# Patient Record
Sex: MALE | Race: BLACK OR AFRICAN AMERICAN | NOT HISPANIC OR LATINO | Employment: OTHER | URBAN - METROPOLITAN AREA
[De-identification: names, ages, dates, MRNs, and addresses within clinical notes are randomized per-mention and may not be internally consistent; named-entity substitution may affect disease eponyms.]

---

## 2023-03-14 PROBLEM — K63.5 COLON POLYP: Status: ACTIVE | Noted: 2023-03-14

## 2023-03-14 PROBLEM — E11.69 TYPE 2 DIABETES MELLITUS WITH HYPERLIPIDEMIA (MULTI): Status: ACTIVE | Noted: 2023-03-14

## 2023-03-14 PROBLEM — H35.3131 EARLY DRY STAGE NONEXUDATIVE AGE-RELATED MACULAR DEGENERATION OF BOTH EYES: Status: ACTIVE | Noted: 2023-03-14

## 2023-03-14 PROBLEM — H52.03 HYPEROPIA OF BOTH EYES WITH REGULAR ASTIGMATISM: Status: ACTIVE | Noted: 2023-03-14

## 2023-03-14 PROBLEM — E78.5 TYPE 2 DIABETES MELLITUS WITH HYPERLIPIDEMIA (MULTI): Status: ACTIVE | Noted: 2023-03-14

## 2023-03-14 PROBLEM — E66.811 OBESITY (BMI 30.0-34.9): Status: ACTIVE | Noted: 2023-03-14

## 2023-03-14 PROBLEM — C18.9 ADENOCARCINOMA OF COLON (MULTI): Status: ACTIVE | Noted: 2023-03-14

## 2023-03-14 PROBLEM — I51.7 LEFT ATRIAL DILATATION: Status: ACTIVE | Noted: 2023-03-14

## 2023-03-14 PROBLEM — N18.9 CHRONIC KIDNEY DISEASE: Status: ACTIVE | Noted: 2023-03-14

## 2023-03-14 PROBLEM — E78.5 HYPERLIPIDEMIA: Status: ACTIVE | Noted: 2023-03-14

## 2023-03-14 PROBLEM — I35.8 AORTIC VALVE SCLEROSIS: Status: ACTIVE | Noted: 2023-03-14

## 2023-03-14 PROBLEM — R01.1 HEART MURMUR: Status: ACTIVE | Noted: 2023-03-14

## 2023-03-14 PROBLEM — H43.399 VITREOUS OPACITY: Status: ACTIVE | Noted: 2023-03-14

## 2023-03-14 PROBLEM — E11.9 DIABETES MELLITUS (MULTI): Status: ACTIVE | Noted: 2023-03-14

## 2023-03-14 PROBLEM — H52.223 REGULAR ASTIGMATISM OF BOTH EYES WITH PRESBYOPIA: Status: ACTIVE | Noted: 2023-03-14

## 2023-03-14 PROBLEM — Q43.8 REDUNDANT COLON: Status: ACTIVE | Noted: 2023-03-14

## 2023-03-14 PROBLEM — H25.043 POSTERIOR SUBCAPSULAR AGE-RELATED CATARACT, BOTH EYES: Status: ACTIVE | Noted: 2023-03-14

## 2023-03-14 PROBLEM — D59.39: Status: ACTIVE | Noted: 2023-03-14

## 2023-03-14 PROBLEM — I51.7 MILD CONCENTRIC LEFT VENTRICULAR HYPERTROPHY (LVH): Status: ACTIVE | Noted: 2023-03-14

## 2023-03-14 PROBLEM — H25.13 CATARACT, NUCLEAR SCLEROTIC, BOTH EYES: Status: ACTIVE | Noted: 2023-03-14

## 2023-03-14 PROBLEM — R93.1 ELEVATED CORONARY ARTERY CALCIUM SCORE: Status: ACTIVE | Noted: 2023-03-14

## 2023-03-14 PROBLEM — I10 HTN (HYPERTENSION): Status: ACTIVE | Noted: 2023-03-14

## 2023-03-14 PROBLEM — M10.9 GOUT: Status: ACTIVE | Noted: 2023-03-14

## 2023-03-14 PROBLEM — H52.223 HYPEROPIA OF BOTH EYES WITH REGULAR ASTIGMATISM: Status: ACTIVE | Noted: 2023-03-14

## 2023-03-14 PROBLEM — C80.1 ADENOCARCINOMA IN ADENOMATOUS POLYP (MULTI): Status: ACTIVE | Noted: 2023-03-14

## 2023-03-14 PROBLEM — H52.4 REGULAR ASTIGMATISM OF BOTH EYES WITH PRESBYOPIA: Status: ACTIVE | Noted: 2023-03-14

## 2023-03-14 PROBLEM — I25.10 CAD (CORONARY ARTERY DISEASE): Status: ACTIVE | Noted: 2023-03-14

## 2023-03-14 PROBLEM — D64.9 ANEMIA: Status: ACTIVE | Noted: 2023-03-14

## 2023-03-14 PROBLEM — K57.90 DIVERTICULOSIS: Status: ACTIVE | Noted: 2023-03-14

## 2023-03-14 PROBLEM — E66.9 OBESITY (BMI 30.0-34.9): Status: ACTIVE | Noted: 2023-03-14

## 2023-03-14 PROBLEM — I73.9 PERIPHERAL ARTERY DISEASE (CMS-HCC): Status: ACTIVE | Noted: 2023-03-14

## 2023-03-14 PROBLEM — K80.20 CHOLELITHIASES: Status: ACTIVE | Noted: 2023-03-14

## 2023-03-14 PROBLEM — H52.13 BILATERAL MYOPIA: Status: ACTIVE | Noted: 2023-03-14

## 2023-03-14 RX ORDER — CLONIDINE HYDROCHLORIDE 0.1 MG/1
1 TABLET ORAL 2 TIMES DAILY
COMMUNITY
Start: 2021-07-23 | End: 2023-11-06

## 2023-03-14 RX ORDER — AMLODIPINE BESYLATE 10 MG/1
1 TABLET ORAL DAILY
COMMUNITY
Start: 2020-02-01 | End: 2023-11-06

## 2023-03-14 RX ORDER — EZETIMIBE 10 MG/1
1 TABLET ORAL DAILY
COMMUNITY
Start: 2022-11-29 | End: 2023-11-10

## 2023-03-14 RX ORDER — LOSARTAN POTASSIUM 100 MG/1
1 TABLET ORAL DAILY
COMMUNITY
Start: 2021-08-30 | End: 2023-11-06

## 2023-03-14 RX ORDER — METOPROLOL SUCCINATE 100 MG/1
1 TABLET, EXTENDED RELEASE ORAL DAILY
COMMUNITY
Start: 2021-08-30 | End: 2024-05-24 | Stop reason: WASHOUT

## 2023-03-14 RX ORDER — ATORVASTATIN CALCIUM 40 MG/1
1 TABLET, FILM COATED ORAL DAILY
COMMUNITY
Start: 2021-08-30 | End: 2024-01-29

## 2023-03-21 ENCOUNTER — OFFICE VISIT (OUTPATIENT)
Dept: PRIMARY CARE | Facility: CLINIC | Age: 73
End: 2023-03-21
Payer: MEDICARE

## 2023-03-21 ENCOUNTER — LAB (OUTPATIENT)
Dept: LAB | Facility: LAB | Age: 73
End: 2023-03-21
Payer: MEDICARE

## 2023-03-21 VITALS
TEMPERATURE: 96.4 F | RESPIRATION RATE: 14 BRPM | HEART RATE: 54 BPM | HEIGHT: 68 IN | SYSTOLIC BLOOD PRESSURE: 162 MMHG | BODY MASS INDEX: 29.25 KG/M2 | DIASTOLIC BLOOD PRESSURE: 84 MMHG | WEIGHT: 193 LBS | OXYGEN SATURATION: 99 %

## 2023-03-21 DIAGNOSIS — Z00.00 MEDICARE ANNUAL WELLNESS VISIT, SUBSEQUENT: Primary | ICD-10-CM

## 2023-03-21 DIAGNOSIS — E78.5 HYPERLIPIDEMIA, UNSPECIFIED HYPERLIPIDEMIA TYPE: ICD-10-CM

## 2023-03-21 DIAGNOSIS — I10 PRIMARY HYPERTENSION: ICD-10-CM

## 2023-03-21 DIAGNOSIS — M1A.9XX0 CHRONIC GOUT WITHOUT TOPHUS, UNSPECIFIED CAUSE, UNSPECIFIED SITE: ICD-10-CM

## 2023-03-21 DIAGNOSIS — E11.9 TYPE 2 DIABETES MELLITUS WITHOUT COMPLICATION, WITHOUT LONG-TERM CURRENT USE OF INSULIN (MULTI): ICD-10-CM

## 2023-03-21 DIAGNOSIS — D64.9 ANEMIA, UNSPECIFIED TYPE: ICD-10-CM

## 2023-03-21 DIAGNOSIS — Z12.5 PROSTATE CANCER SCREENING: ICD-10-CM

## 2023-03-21 DIAGNOSIS — Z13.6 SCREENING FOR AAA (ABDOMINAL AORTIC ANEURYSM): ICD-10-CM

## 2023-03-21 PROBLEM — K63.5 COLON POLYP: Status: RESOLVED | Noted: 2023-03-14 | Resolved: 2023-03-21

## 2023-03-21 PROBLEM — C80.1 ADENOCARCINOMA IN ADENOMATOUS POLYP (MULTI): Status: RESOLVED | Noted: 2023-03-14 | Resolved: 2023-03-21

## 2023-03-21 LAB
ALANINE AMINOTRANSFERASE (SGPT) (U/L) IN SER/PLAS: 13 U/L (ref 10–52)
ALBUMIN (MG/L) IN URINE: 54.4 MG/L
ALBUMIN/CREATININE (UG/MG) IN URINE: 51.3 UG/MG CRT (ref 0–30)
ANION GAP IN SER/PLAS: 13 MMOL/L (ref 10–20)
ASPARTATE AMINOTRANSFERASE (SGOT) (U/L) IN SER/PLAS: 13 U/L (ref 9–39)
CALCIUM (MG/DL) IN SER/PLAS: 9.3 MG/DL (ref 8.6–10.6)
CARBON DIOXIDE, TOTAL (MMOL/L) IN SER/PLAS: 24 MMOL/L (ref 21–32)
CHLORIDE (MMOL/L) IN SER/PLAS: 109 MMOL/L (ref 98–107)
CHOLESTEROL (MG/DL) IN SER/PLAS: 190 MG/DL (ref 0–199)
CHOLESTEROL IN HDL (MG/DL) IN SER/PLAS: 37.4 MG/DL
CHOLESTEROL/HDL RATIO: 5.1
COBALAMIN (VITAMIN B12) (PG/ML) IN SER/PLAS: 283 PG/ML (ref 211–911)
CREATININE (MG/DL) IN SER/PLAS: 1.51 MG/DL (ref 0.5–1.3)
CREATININE (MG/DL) IN URINE: 106 MG/DL (ref 20–370)
ERYTHROCYTE DISTRIBUTION WIDTH (RATIO) BY AUTOMATED COUNT: 12.7 % (ref 11.5–14.5)
ERYTHROCYTE MEAN CORPUSCULAR HEMOGLOBIN CONCENTRATION (G/DL) BY AUTOMATED: 31.6 G/DL (ref 32–36)
ERYTHROCYTE MEAN CORPUSCULAR VOLUME (FL) BY AUTOMATED COUNT: 98 FL (ref 80–100)
ERYTHROCYTES (10*6/UL) IN BLOOD BY AUTOMATED COUNT: 3.82 X10E12/L (ref 4.5–5.9)
ESTIMATED AVERAGE GLUCOSE FOR HBA1C: 174 MG/DL
FERRITIN (UG/LL) IN SER/PLAS: 127 UG/L (ref 20–300)
FOLATE (NG/ML) IN SER/PLAS: 9.1 NG/ML
GFR MALE: 48 ML/MIN/1.73M2
GLUCOSE (MG/DL) IN SER/PLAS: 165 MG/DL (ref 74–99)
HEMATOCRIT (%) IN BLOOD BY AUTOMATED COUNT: 37.3 % (ref 41–52)
HEMOGLOBIN (G/DL) IN BLOOD: 11.8 G/DL (ref 13.5–17.5)
HEMOGLOBIN A1C/HEMOGLOBIN TOTAL IN BLOOD: 7.7 %
IRON (UG/DL) IN SER/PLAS: 69 UG/DL (ref 35–150)
IRON BINDING CAPACITY (UG/DL) IN SER/PLAS: 324 UG/DL (ref 240–445)
IRON SATURATION (%) IN SER/PLAS: 21 % (ref 25–45)
LDL: 131 MG/DL (ref 0–99)
LEUKOCYTES (10*3/UL) IN BLOOD BY AUTOMATED COUNT: 3.8 X10E9/L (ref 4.4–11.3)
NRBC (PER 100 WBCS) BY AUTOMATED COUNT: 0 /100 WBC (ref 0–0)
PLATELETS (10*3/UL) IN BLOOD AUTOMATED COUNT: 227 X10E9/L (ref 150–450)
POTASSIUM (MMOL/L) IN SER/PLAS: 4.5 MMOL/L (ref 3.5–5.3)
PROSTATE SPECIFIC ANTIGEN,SCREEN: 1.23 NG/ML (ref 0–4)
SODIUM (MMOL/L) IN SER/PLAS: 141 MMOL/L (ref 136–145)
THYROTROPIN (MIU/L) IN SER/PLAS BY DETECTION LIMIT <= 0.05 MIU/L: 1.92 MIU/L (ref 0.44–3.98)
TRIGLYCERIDE (MG/DL) IN SER/PLAS: 108 MG/DL (ref 0–149)
URATE (MG/DL) IN SER/PLAS: 6.3 MG/DL (ref 4–7.5)
UREA NITROGEN (MG/DL) IN SER/PLAS: 24 MG/DL (ref 6–23)
VLDL: 22 MG/DL (ref 0–40)

## 2023-03-21 PROCEDURE — 1036F TOBACCO NON-USER: CPT | Performed by: FAMILY MEDICINE

## 2023-03-21 PROCEDURE — 82570 ASSAY OF URINE CREATININE: CPT

## 2023-03-21 PROCEDURE — 80061 LIPID PANEL: CPT

## 2023-03-21 PROCEDURE — 4010F ACE/ARB THERAPY RXD/TAKEN: CPT | Performed by: FAMILY MEDICINE

## 2023-03-21 PROCEDURE — 83036 HEMOGLOBIN GLYCOSYLATED A1C: CPT

## 2023-03-21 PROCEDURE — 84550 ASSAY OF BLOOD/URIC ACID: CPT

## 2023-03-21 PROCEDURE — G0439 PPPS, SUBSEQ VISIT: HCPCS | Performed by: FAMILY MEDICINE

## 2023-03-21 PROCEDURE — 84153 ASSAY OF PSA TOTAL: CPT

## 2023-03-21 PROCEDURE — 83540 ASSAY OF IRON: CPT

## 2023-03-21 PROCEDURE — 83550 IRON BINDING TEST: CPT

## 2023-03-21 PROCEDURE — 36415 COLL VENOUS BLD VENIPUNCTURE: CPT

## 2023-03-21 PROCEDURE — 80048 BASIC METABOLIC PNL TOTAL CA: CPT

## 2023-03-21 PROCEDURE — 85027 COMPLETE CBC AUTOMATED: CPT

## 2023-03-21 PROCEDURE — 84450 TRANSFERASE (AST) (SGOT): CPT

## 2023-03-21 PROCEDURE — 3077F SYST BP >= 140 MM HG: CPT | Performed by: FAMILY MEDICINE

## 2023-03-21 PROCEDURE — 84460 ALANINE AMINO (ALT) (SGPT): CPT

## 2023-03-21 PROCEDURE — 82043 UR ALBUMIN QUANTITATIVE: CPT

## 2023-03-21 PROCEDURE — 84443 ASSAY THYROID STIM HORMONE: CPT

## 2023-03-21 PROCEDURE — 1160F RVW MEDS BY RX/DR IN RCRD: CPT | Performed by: FAMILY MEDICINE

## 2023-03-21 PROCEDURE — 82607 VITAMIN B-12: CPT

## 2023-03-21 PROCEDURE — 82728 ASSAY OF FERRITIN: CPT

## 2023-03-21 PROCEDURE — 3051F HG A1C>EQUAL 7.0%<8.0%: CPT | Performed by: FAMILY MEDICINE

## 2023-03-21 PROCEDURE — 3079F DIAST BP 80-89 MM HG: CPT | Performed by: FAMILY MEDICINE

## 2023-03-21 PROCEDURE — 1159F MED LIST DOCD IN RCRD: CPT | Performed by: FAMILY MEDICINE

## 2023-03-21 PROCEDURE — 82746 ASSAY OF FOLIC ACID SERUM: CPT

## 2023-03-21 ASSESSMENT — PATIENT HEALTH QUESTIONNAIRE - PHQ9
2. FEELING DOWN, DEPRESSED OR HOPELESS: NOT AT ALL
1. LITTLE INTEREST OR PLEASURE IN DOING THINGS: NOT AT ALL
SUM OF ALL RESPONSES TO PHQ9 QUESTIONS 1 AND 2: 0

## 2023-03-21 NOTE — PATIENT INSTRUCTIONS
Flu shot Up to date  PPSV23 Up to date  PCV13 N/A  PCV20 N/A  Shingrix Recommended  Colon cancer screening 8/2022.  Lung cancer screening N/A  AAA screening Ordered  HIV screening N/A  Prostate cancer screening  Ordered    Fasting labs.  Recommend living will, health care power of .     F/U for diabetes visit if HbA1c >6.4.

## 2023-03-21 NOTE — PROGRESS NOTES
"Subjective   Reason for Visit: Carlos Vallejo is an 72 y.o. male here for a Medicare Wellness visit.     Medicare Wellness Exam    The patent is being seen for a follow up annual wellness visit  Past Medical, Surgical and family History: Reviewed and updated in chart  Interval History: Patient has not been hospitalized previously  Medications and Supplements: Review of all medications by a prescribing practitioner or clinical pharmacist (such as prescriptions, OTC, Herbal therapies and supplements) documented in the medical record.    Patient Self-Assessment of health: Good  Tobacco Use: No  Alcohol Use: Yes  Illicit drug use: No  Patient using opioids: No    Current Diet: low salt  Adequate fluid intake: Yes  Caffeine intake: Yes  Exercise frequency: Infrequently    Depression/Suicide screening: PHQ2/ PHQ9 (see screenings tab)    Hearing impairment: No  Uses hearing aids No  Cognitive impairment Observation: No   Patient or family reported cognitive impairment: No    Bathing: independent  Dressing: independent  Walking: independent  Taking Medications: independent  Feeding: independent  Personal Hygiene: independent  Managing Finances: independent  Shopping: independent  Housework/Basic Home Maintenance: independent  Handling transportation: independent  Preparing meals: independent    Bowels: continent  Bladder: continent    Falls Risk: has notfallen in last 6 months.    Home Safety Risk Factors: Home Safety Risk Factors: None  Advanced Directives:  Living will: No POA: No    Specialists:  Cardiology, nephrology.    Of note:   Never returned for diabetic visit as advised.  Stopped Clonidine on his own (Rxd by cardiology).    HPI    Patient Care Team:  Sergei Dowell MD as PCP - General     Objective   Vitals:  /84   Pulse 54   Temp 35.8 °C (96.4 °F)   Resp 14   Ht 1.727 m (5' 8\")   Wt 87.5 kg (193 lb)   SpO2 99%   BMI 29.35 kg/m²       Physical Exam  Constitutional:       General: He is not in acute " distress.     Appearance: Normal appearance.   Musculoskeletal:      Comments: Ambulating w/out assistance   Neurological:      Mental Status: He is oriented to person, place, and time.   Psychiatric:         Mood and Affect: Mood normal.         Behavior: Behavior normal.     Assessment/Plan   Diagnoses and all orders for this visit:  Medicare annual wellness visit, subsequent  Anemia, unspecified type  -     Ferritin; Future  -     Folate; Future  -     Iron and TIBC; Future  -     Vitamin B12; Future  Chronic gout without tophus, unspecified cause, unspecified site  -     Uric Acid; Future  Primary hypertension  -     CBC; Future  -     Basic Metabolic Panel; Future  Type 2 diabetes mellitus without complication, without long-term current use of insulin (CMS/[facility])  -     TSH with reflex to Free T4 if abnormal; Future  -     Albumin , Urine Random; Future  -     Hemoglobin A1C; Future  Screening for AAA (abdominal aortic aneurysm)  -     Vascular US abdominal aorta aneurysm AAA screening; Future  Prostate cancer screening  -     Prostate Specific Antigen, Screen; Future  Hyperlipidemia, unspecified hyperlipidemia type  -     Lipid Panel; Future  -     Alanine Aminotransferase; Future  -     Aspartate Aminotransferase; Future    Flu shot Up to date  PPSV23 Up to date  PCV13 N/A  PCV20 N/A  Shingrix Recommended  Colon cancer screening 8/2022.  Lung cancer screening N/A  AAA screening Ordered  HIV screening N/A  Prostate cancer screening  Ordered    Fasting labs.  Recommend living will, health care power of .     F/U for diabetes visit if HbA1c >6.4.

## 2023-04-04 ENCOUNTER — APPOINTMENT (OUTPATIENT)
Dept: PRIMARY CARE | Facility: CLINIC | Age: 73
End: 2023-04-04
Payer: MEDICARE

## 2023-04-04 ENCOUNTER — OFFICE VISIT (OUTPATIENT)
Dept: PRIMARY CARE | Facility: CLINIC | Age: 73
End: 2023-04-04
Payer: MEDICARE

## 2023-04-04 VITALS
HEART RATE: 73 BPM | RESPIRATION RATE: 14 BRPM | TEMPERATURE: 97 F | HEIGHT: 68 IN | WEIGHT: 193 LBS | SYSTOLIC BLOOD PRESSURE: 147 MMHG | BODY MASS INDEX: 29.25 KG/M2 | DIASTOLIC BLOOD PRESSURE: 87 MMHG

## 2023-04-04 DIAGNOSIS — D72.819 LEUKOPENIA, UNSPECIFIED TYPE: ICD-10-CM

## 2023-04-04 DIAGNOSIS — E11.22 TYPE 2 DIABETES MELLITUS WITH STAGE 3A CHRONIC KIDNEY DISEASE, WITHOUT LONG-TERM CURRENT USE OF INSULIN (MULTI): Primary | ICD-10-CM

## 2023-04-04 DIAGNOSIS — D64.9 ANEMIA, UNSPECIFIED TYPE: ICD-10-CM

## 2023-04-04 DIAGNOSIS — N18.31 TYPE 2 DIABETES MELLITUS WITH STAGE 3A CHRONIC KIDNEY DISEASE, WITHOUT LONG-TERM CURRENT USE OF INSULIN (MULTI): Primary | ICD-10-CM

## 2023-04-04 PROBLEM — E78.5 TYPE 2 DIABETES MELLITUS WITH HYPERLIPIDEMIA (MULTI): Status: RESOLVED | Noted: 2023-03-14 | Resolved: 2023-04-04

## 2023-04-04 PROBLEM — E11.69 TYPE 2 DIABETES MELLITUS WITH HYPERLIPIDEMIA (MULTI): Status: RESOLVED | Noted: 2023-03-14 | Resolved: 2023-04-04

## 2023-04-04 PROCEDURE — 3051F HG A1C>EQUAL 7.0%<8.0%: CPT | Performed by: FAMILY MEDICINE

## 2023-04-04 PROCEDURE — 3077F SYST BP >= 140 MM HG: CPT | Performed by: FAMILY MEDICINE

## 2023-04-04 PROCEDURE — 1160F RVW MEDS BY RX/DR IN RCRD: CPT | Performed by: FAMILY MEDICINE

## 2023-04-04 PROCEDURE — 3079F DIAST BP 80-89 MM HG: CPT | Performed by: FAMILY MEDICINE

## 2023-04-04 PROCEDURE — 1159F MED LIST DOCD IN RCRD: CPT | Performed by: FAMILY MEDICINE

## 2023-04-04 PROCEDURE — 99214 OFFICE O/P EST MOD 30 MIN: CPT | Performed by: FAMILY MEDICINE

## 2023-04-04 PROCEDURE — 1036F TOBACCO NON-USER: CPT | Performed by: FAMILY MEDICINE

## 2023-04-04 PROCEDURE — 4010F ACE/ARB THERAPY RXD/TAKEN: CPT | Performed by: FAMILY MEDICINE

## 2023-04-04 NOTE — PATIENT INSTRUCTIONS
Reviewed lab results.  Referred to nutritionist.  Patient declined Metformin or any other medication for diabetes (wants to do 3 month trial of diet/exercise).  Continue Losartan blood pressure (managed by cardiology) and renal protection.  Continue Atorvastatin for cholesterol (managed by cardiology).  Unable to take OTC Aspirin 81 mg daily for cardiac protection (patient is allergic)  Referred to ophthalmology and podiatry for annual eye and foot exams.  Recommend exercising (brisk walking, jogging, swimming, bicycling) 30 minutes/day, 5 days/week.  Recommend weight loss efforts (see www.yourweightmatters.org/category/nutrition for ideas).  Join the Living with Type 2 Diabetes Program to receive healthy recipes, tools to help care for your diabetes, opportunities for online and community support.    Call 1-825.527.5188 or visit www.diabetes.org/living    Follow up w/cardiology for blood pressure and cholesterol management.    F/U 3 months (labs prior to visit): Diabetes follow up, Consider medication if HbA1c not at goal, Consider hematology referral if WBC still decreased.

## 2023-04-04 NOTE — PROGRESS NOTES
"Subjective   Patient ID: Carlos Vallejo is a 72 y.o. male who presents for discuss results.    HPI   Labs ordered at lats visit.    Here for new onset diabetes visit.    Review of Systems   All other systems reviewed and are negative.  Objective   /87   Pulse 73   Temp 36.1 °C (97 °F)   Resp 14   Ht 1.727 m (5' 8\")   Wt 87.5 kg (193 lb)   BMI 29.35 kg/m²   Physical Exam  Constitutional:       General: He is not in acute distress.     Appearance: Normal appearance.   Musculoskeletal:      Comments: Ambulating w/out assistance   Neurological:      Mental Status: He is oriented to person, place, and time.   Psychiatric:         Mood and Affect: Mood normal.         Behavior: Behavior normal.     Assessment/Plan   Diagnoses and all orders for this visit:  Type 2 diabetes mellitus with stage 3a chronic kidney disease, without long-term current use of insulin (CMS/[facility])  -     Referral to Nutrition Services; Future  -     Referral to Ophthalmology; Future  -     Referral to Podiatry; Future  -     Hemoglobin A1C; Future  -     Basic Metabolic Panel; Future  Anemia, unspecified type  -     CBC and Auto Differential; Future  Leukopenia, unspecified type  -     CBC and Auto Differential; Future    Reviewed lab results.  Referred to nutritionist.  Patient declined Metformin or any other medication for diabetes (wants to do 3 month trial of diet/exercise).  Continue Losartan blood pressure (managed by cardiology) and renal protection.  Continue Atorvastatin for cholesterol (managed by cardiology).  Unable to take OTC Aspirin 81 mg daily for cardiac protection (patient is allergic).  Referred to ophthalmology and podiatry for annual eye and foot exams.  Recommend exercising (brisk walking, jogging, swimming, bicycling) 30 minutes/day, 5 days/week.  Recommend weight loss efforts (see www.yourweightmatters.org/category/nutrition for ideas).  Join the Living with Type 2 Diabetes Program to receive healthy recipes, tools " to help care for your diabetes, opportunities for online and community support.    Call 1-288.902.9746 or visit www.diabetes.org/living    Follow up w/cardiology for blood pressure and cholesterol management.    F/U 3 months (labs prior to visit): Diabetes follow up, Consider medication if HbA1c not at goal, Consider hematology referral if WBC still decreased.

## 2023-08-24 ENCOUNTER — HOSPITAL ENCOUNTER (OUTPATIENT)
Dept: DATA CONVERSION | Facility: HOSPITAL | Age: 73
End: 2023-08-24
Attending: INTERNAL MEDICINE | Admitting: INTERNAL MEDICINE
Payer: MEDICARE

## 2023-08-24 DIAGNOSIS — K52.9 NONINFECTIVE GASTROENTERITIS AND COLITIS, UNSPECIFIED: ICD-10-CM

## 2023-08-24 DIAGNOSIS — Z12.11 ENCOUNTER FOR SCREENING FOR MALIGNANT NEOPLASM OF COLON: ICD-10-CM

## 2023-08-24 DIAGNOSIS — Z86.010 PERSONAL HISTORY OF COLONIC POLYPS: ICD-10-CM

## 2023-08-24 DIAGNOSIS — Z85.038 PERSONAL HISTORY OF OTHER MALIGNANT NEOPLASM OF LARGE INTESTINE: ICD-10-CM

## 2023-08-24 DIAGNOSIS — K64.8 OTHER HEMORRHOIDS: ICD-10-CM

## 2023-08-24 DIAGNOSIS — K57.30 DIVERTICULOSIS OF LARGE INTESTINE WITHOUT PERFORATION OR ABSCESS WITHOUT BLEEDING: ICD-10-CM

## 2023-08-24 DIAGNOSIS — K64.4 RESIDUAL HEMORRHOIDAL SKIN TAGS: ICD-10-CM

## 2023-08-30 LAB
COMPLETE PATHOLOGY REPORT: NORMAL
CONVERTED CLINICAL DIAGNOSIS-HISTORY: NORMAL
CONVERTED FINAL DIAGNOSIS: NORMAL
CONVERTED FINAL REPORT PDF LINK TO COPY AND PASTE: NORMAL
CONVERTED GROSS DESCRIPTION: NORMAL

## 2023-10-25 DIAGNOSIS — I10 ESSENTIAL (PRIMARY) HYPERTENSION: ICD-10-CM

## 2023-11-06 RX ORDER — LOSARTAN POTASSIUM 100 MG/1
100 TABLET ORAL DAILY
Qty: 90 TABLET | Refills: 0 | Status: SHIPPED | OUTPATIENT
Start: 2023-11-06 | End: 2024-05-20

## 2023-11-06 RX ORDER — CLONIDINE HYDROCHLORIDE 0.1 MG/1
0.1 TABLET ORAL 2 TIMES DAILY
Qty: 180 TABLET | Refills: 0 | Status: SHIPPED | OUTPATIENT
Start: 2023-11-06 | End: 2024-05-24 | Stop reason: SDUPTHER

## 2023-11-06 RX ORDER — AMLODIPINE BESYLATE 10 MG/1
10 TABLET ORAL DAILY
Qty: 90 TABLET | Refills: 0 | Status: SHIPPED | OUTPATIENT
Start: 2023-11-06 | End: 2024-05-24 | Stop reason: SDUPTHER

## 2024-01-26 DIAGNOSIS — E78.5 HYPERLIPIDEMIA, UNSPECIFIED: Primary | ICD-10-CM

## 2024-01-29 RX ORDER — ATORVASTATIN CALCIUM 40 MG/1
40 TABLET, FILM COATED ORAL DAILY
Qty: 90 TABLET | Refills: 3 | Status: SHIPPED | OUTPATIENT
Start: 2024-01-29 | End: 2024-05-24 | Stop reason: SDUPTHER

## 2024-02-06 ENCOUNTER — OFFICE VISIT (OUTPATIENT)
Dept: PRIMARY CARE | Facility: CLINIC | Age: 74
End: 2024-02-06
Payer: COMMERCIAL

## 2024-02-06 ENCOUNTER — LAB (OUTPATIENT)
Dept: LAB | Facility: LAB | Age: 74
End: 2024-02-06
Payer: COMMERCIAL

## 2024-02-06 VITALS
WEIGHT: 194 LBS | OXYGEN SATURATION: 96 % | RESPIRATION RATE: 16 BRPM | BODY MASS INDEX: 29.4 KG/M2 | DIASTOLIC BLOOD PRESSURE: 74 MMHG | HEART RATE: 69 BPM | TEMPERATURE: 97.7 F | HEIGHT: 68 IN | SYSTOLIC BLOOD PRESSURE: 178 MMHG

## 2024-02-06 DIAGNOSIS — D64.9 ANEMIA, UNSPECIFIED TYPE: ICD-10-CM

## 2024-02-06 DIAGNOSIS — N18.31 TYPE 2 DIABETES MELLITUS WITH STAGE 3A CHRONIC KIDNEY DISEASE, WITHOUT LONG-TERM CURRENT USE OF INSULIN (MULTI): ICD-10-CM

## 2024-02-06 DIAGNOSIS — E78.5 HYPERLIPIDEMIA, UNSPECIFIED HYPERLIPIDEMIA TYPE: ICD-10-CM

## 2024-02-06 DIAGNOSIS — D72.819 LEUKOPENIA, UNSPECIFIED TYPE: ICD-10-CM

## 2024-02-06 DIAGNOSIS — E11.22 TYPE 2 DIABETES MELLITUS WITH STAGE 3A CHRONIC KIDNEY DISEASE, WITHOUT LONG-TERM CURRENT USE OF INSULIN (MULTI): ICD-10-CM

## 2024-02-06 DIAGNOSIS — E11.22 TYPE 2 DIABETES MELLITUS WITH STAGE 3A CHRONIC KIDNEY DISEASE, WITHOUT LONG-TERM CURRENT USE OF INSULIN (MULTI): Primary | ICD-10-CM

## 2024-02-06 DIAGNOSIS — I10 PRIMARY HYPERTENSION: ICD-10-CM

## 2024-02-06 DIAGNOSIS — N18.31 TYPE 2 DIABETES MELLITUS WITH STAGE 3A CHRONIC KIDNEY DISEASE, WITHOUT LONG-TERM CURRENT USE OF INSULIN (MULTI): Primary | ICD-10-CM

## 2024-02-06 LAB
ANION GAP SERPL CALC-SCNC: 12 MMOL/L (ref 10–20)
BASOPHILS # BLD AUTO: 0.02 X10*3/UL (ref 0–0.1)
BASOPHILS NFR BLD AUTO: 0.4 %
BUN SERPL-MCNC: 22 MG/DL (ref 6–23)
CALCIUM SERPL-MCNC: 9.4 MG/DL (ref 8.6–10.6)
CHLORIDE SERPL-SCNC: 109 MMOL/L (ref 98–107)
CO2 SERPL-SCNC: 27 MMOL/L (ref 21–32)
CREAT SERPL-MCNC: 1.77 MG/DL (ref 0.5–1.3)
EGFRCR SERPLBLD CKD-EPI 2021: 40 ML/MIN/1.73M*2
EOSINOPHIL # BLD AUTO: 0.19 X10*3/UL (ref 0–0.4)
EOSINOPHIL NFR BLD AUTO: 4.3 %
ERYTHROCYTE [DISTWIDTH] IN BLOOD BY AUTOMATED COUNT: 12.9 % (ref 11.5–14.5)
EST. AVERAGE GLUCOSE BLD GHB EST-MCNC: 151 MG/DL
GLUCOSE SERPL-MCNC: 129 MG/DL (ref 74–99)
HBA1C MFR BLD: 6.9 %
HCT VFR BLD AUTO: 41 % (ref 41–52)
HGB BLD-MCNC: 13.1 G/DL (ref 13.5–17.5)
IMM GRANULOCYTES # BLD AUTO: 0.01 X10*3/UL (ref 0–0.5)
IMM GRANULOCYTES NFR BLD AUTO: 0.2 % (ref 0–0.9)
LYMPHOCYTES # BLD AUTO: 1.82 X10*3/UL (ref 0.8–3)
LYMPHOCYTES NFR BLD AUTO: 40.9 %
MCH RBC QN AUTO: 31.1 PG (ref 26–34)
MCHC RBC AUTO-ENTMCNC: 32 G/DL (ref 32–36)
MCV RBC AUTO: 97 FL (ref 80–100)
MONOCYTES # BLD AUTO: 0.48 X10*3/UL (ref 0.05–0.8)
MONOCYTES NFR BLD AUTO: 10.8 %
NEUTROPHILS # BLD AUTO: 1.93 X10*3/UL (ref 1.6–5.5)
NEUTROPHILS NFR BLD AUTO: 43.4 %
NRBC BLD-RTO: 0 /100 WBCS (ref 0–0)
PLATELET # BLD AUTO: 279 X10*3/UL (ref 150–450)
POTASSIUM SERPL-SCNC: 4.7 MMOL/L (ref 3.5–5.3)
RBC # BLD AUTO: 4.21 X10*6/UL (ref 4.5–5.9)
SODIUM SERPL-SCNC: 143 MMOL/L (ref 136–145)
WBC # BLD AUTO: 4.5 X10*3/UL (ref 4.4–11.3)

## 2024-02-06 PROCEDURE — 85025 COMPLETE CBC W/AUTO DIFF WBC: CPT

## 2024-02-06 PROCEDURE — 3077F SYST BP >= 140 MM HG: CPT | Performed by: FAMILY MEDICINE

## 2024-02-06 PROCEDURE — 83036 HEMOGLOBIN GLYCOSYLATED A1C: CPT

## 2024-02-06 PROCEDURE — 3078F DIAST BP <80 MM HG: CPT | Performed by: FAMILY MEDICINE

## 2024-02-06 PROCEDURE — 4010F ACE/ARB THERAPY RXD/TAKEN: CPT | Performed by: FAMILY MEDICINE

## 2024-02-06 PROCEDURE — 80048 BASIC METABOLIC PNL TOTAL CA: CPT

## 2024-02-06 PROCEDURE — 36415 COLL VENOUS BLD VENIPUNCTURE: CPT

## 2024-02-06 PROCEDURE — 1036F TOBACCO NON-USER: CPT | Performed by: FAMILY MEDICINE

## 2024-02-06 PROCEDURE — 3044F HG A1C LEVEL LT 7.0%: CPT | Performed by: FAMILY MEDICINE

## 2024-02-06 PROCEDURE — 1159F MED LIST DOCD IN RCRD: CPT | Performed by: FAMILY MEDICINE

## 2024-02-06 PROCEDURE — 1160F RVW MEDS BY RX/DR IN RCRD: CPT | Performed by: FAMILY MEDICINE

## 2024-02-06 PROCEDURE — 99214 OFFICE O/P EST MOD 30 MIN: CPT | Performed by: FAMILY MEDICINE

## 2024-02-06 NOTE — PROGRESS NOTES
"Subjective   Patient ID: Carlos Vallejo is a 73 y.o. male who presents for Follow-up (DM visit).    HPI   Had new onset DM visit 10 months ago.  Did not follow up 7 months ago as advised.  Did not return for follow up labs 7 months ago as advised.  Previously declined Tx for DM.  Now states he might be willing to start DM med.    H/O HLD, HTN.  Tx by cardiology.  Has not had meds > 1 month.    Review of Systems  No other complaints.     Objective   /74   Pulse 69   Temp 36.5 °C (97.7 °F)   Resp 16   Ht 1.727 m (5' 8\")   Wt 88 kg (194 lb)   SpO2 96%   BMI 29.50 kg/m²     Physical Exam  Constitutional:       General: He is not in acute distress.     Appearance: Normal appearance.   Cardiovascular:      Rate and Rhythm: Normal rate and regular rhythm.      Heart sounds: Murmur (2/6, systolic) heard.   Pulmonary:      Effort: Pulmonary effort is normal.      Breath sounds: Normal breath sounds. No wheezing, rhonchi or rales.   Neurological:      Mental Status: He is oriented to person, place, and time.   Psychiatric:         Mood and Affect: Mood normal.         Behavior: Behavior normal.     Assessment/Plan   Diagnoses and all orders for this visit:  Type 2 diabetes mellitus with stage 3a chronic kidney disease, without long-term current use of insulin (CMS/[facility])  Primary hypertension        -     Tx by cardiology (not compliant w/meds)  Hyperlipidemia, unspecified hyperlipidemia type        -     Tx by cardiology (not compliant w/meds)  Leukopenia, unspecified type    Nonfasting labs previously ordered.  Will consider Farxiga or Jardiance for diabetes and kidney disease progression/cardiovascular risk reduction if HbA1c not at goal.  Will consider hematology referral if WBC still decreased.   Take blood pressure and cholesterol medications as directed.  Follow up with cardiology ASAP.    F/U 3 months: Annual wellness visit.  "

## 2024-02-06 NOTE — PATIENT INSTRUCTIONS
Nonfasting labs previously ordered.  Will consider Farxiga or Jardiance for diabetes and kidney disease progression/cardiovascular risk reduction if HbA1c not at goal.  Will consider hematology referral if WBC still decreased.   Take blood pressure and cholesterol medications as directed.  Follow up with cardiology ASAP.    F/U 3 months: Annual wellness visit.

## 2024-02-18 DIAGNOSIS — N18.31 TYPE 2 DIABETES MELLITUS WITH STAGE 3A CHRONIC KIDNEY DISEASE, WITHOUT LONG-TERM CURRENT USE OF INSULIN (MULTI): Primary | ICD-10-CM

## 2024-02-18 DIAGNOSIS — E11.22 TYPE 2 DIABETES MELLITUS WITH STAGE 3A CHRONIC KIDNEY DISEASE, WITHOUT LONG-TERM CURRENT USE OF INSULIN (MULTI): Primary | ICD-10-CM

## 2024-03-02 DIAGNOSIS — E78.5 HYPERLIPIDEMIA, UNSPECIFIED HYPERLIPIDEMIA TYPE: ICD-10-CM

## 2024-03-02 DIAGNOSIS — E11.9 TYPE 2 DIABETES MELLITUS WITHOUT COMPLICATION, WITHOUT LONG-TERM CURRENT USE OF INSULIN (MULTI): ICD-10-CM

## 2024-03-02 DIAGNOSIS — Z11.59 NEED FOR HEPATITIS C SCREENING TEST: ICD-10-CM

## 2024-03-02 DIAGNOSIS — D64.9 ANEMIA, UNSPECIFIED TYPE: Primary | ICD-10-CM

## 2024-03-02 DIAGNOSIS — I10 PRIMARY HYPERTENSION: ICD-10-CM

## 2024-05-07 ENCOUNTER — OFFICE VISIT (OUTPATIENT)
Dept: PRIMARY CARE | Facility: CLINIC | Age: 74
End: 2024-05-07
Payer: MEDICARE

## 2024-05-07 ENCOUNTER — LAB (OUTPATIENT)
Dept: LAB | Facility: LAB | Age: 74
End: 2024-05-07
Payer: MEDICARE

## 2024-05-07 VITALS
SYSTOLIC BLOOD PRESSURE: 134 MMHG | DIASTOLIC BLOOD PRESSURE: 82 MMHG | OXYGEN SATURATION: 99 % | HEART RATE: 74 BPM | WEIGHT: 190 LBS | BODY MASS INDEX: 28.79 KG/M2 | HEIGHT: 68 IN | RESPIRATION RATE: 12 BRPM

## 2024-05-07 DIAGNOSIS — F17.219 CIGARETTE NICOTINE DEPENDENCE WITH NICOTINE-INDUCED DISORDER: ICD-10-CM

## 2024-05-07 DIAGNOSIS — E11.22 TYPE 2 DIABETES MELLITUS WITH STAGE 3A CHRONIC KIDNEY DISEASE, WITHOUT LONG-TERM CURRENT USE OF INSULIN (MULTI): ICD-10-CM

## 2024-05-07 DIAGNOSIS — E11.9 TYPE 2 DIABETES MELLITUS WITHOUT COMPLICATION, WITHOUT LONG-TERM CURRENT USE OF INSULIN (MULTI): ICD-10-CM

## 2024-05-07 DIAGNOSIS — D64.9 ANEMIA, UNSPECIFIED TYPE: ICD-10-CM

## 2024-05-07 DIAGNOSIS — N18.31 TYPE 2 DIABETES MELLITUS WITH STAGE 3A CHRONIC KIDNEY DISEASE, WITHOUT LONG-TERM CURRENT USE OF INSULIN (MULTI): ICD-10-CM

## 2024-05-07 DIAGNOSIS — I25.10 CORONARY ARTERY DISEASE, UNSPECIFIED VESSEL OR LESION TYPE, UNSPECIFIED WHETHER ANGINA PRESENT, UNSPECIFIED WHETHER NATIVE OR TRANSPLANTED HEART: ICD-10-CM

## 2024-05-07 DIAGNOSIS — E78.5 HYPERLIPIDEMIA, UNSPECIFIED HYPERLIPIDEMIA TYPE: ICD-10-CM

## 2024-05-07 DIAGNOSIS — Z11.59 NEED FOR HEPATITIS C SCREENING TEST: ICD-10-CM

## 2024-05-07 DIAGNOSIS — I35.8 AORTIC VALVE SCLEROSIS: ICD-10-CM

## 2024-05-07 DIAGNOSIS — Z00.00 MEDICARE ANNUAL WELLNESS VISIT, SUBSEQUENT: Primary | ICD-10-CM

## 2024-05-07 DIAGNOSIS — I10 PRIMARY HYPERTENSION: ICD-10-CM

## 2024-05-07 PROBLEM — E66.9 OBESITY (BMI 30.0-34.9): Status: RESOLVED | Noted: 2023-03-14 | Resolved: 2024-05-07

## 2024-05-07 PROBLEM — E66.811 OBESITY (BMI 30.0-34.9): Status: RESOLVED | Noted: 2023-03-14 | Resolved: 2024-05-07

## 2024-05-07 LAB
ALBUMIN SERPL BCP-MCNC: 4 G/DL (ref 3.4–5)
ALP SERPL-CCNC: 104 U/L (ref 33–136)
ALT SERPL W P-5'-P-CCNC: 11 U/L (ref 10–52)
ANION GAP SERPL CALC-SCNC: 12 MMOL/L (ref 10–20)
AST SERPL W P-5'-P-CCNC: 12 U/L (ref 9–39)
BILIRUB SERPL-MCNC: 0.5 MG/DL (ref 0–1.2)
BUN SERPL-MCNC: 16 MG/DL (ref 6–23)
CALCIUM SERPL-MCNC: 9 MG/DL (ref 8.6–10.6)
CHLORIDE SERPL-SCNC: 107 MMOL/L (ref 98–107)
CHOLEST SERPL-MCNC: 136 MG/DL (ref 0–199)
CHOLESTEROL/HDL RATIO: 4
CO2 SERPL-SCNC: 26 MMOL/L (ref 21–32)
CREAT SERPL-MCNC: 1.44 MG/DL (ref 0.5–1.3)
CREAT UR-MCNC: 107.8 MG/DL (ref 20–370)
EGFRCR SERPLBLD CKD-EPI 2021: 51 ML/MIN/1.73M*2
ERYTHROCYTE [DISTWIDTH] IN BLOOD BY AUTOMATED COUNT: 12.1 % (ref 11.5–14.5)
EST. AVERAGE GLUCOSE BLD GHB EST-MCNC: 192 MG/DL
FERRITIN SERPL-MCNC: 98 NG/ML (ref 20–300)
FOLATE SERPL-MCNC: 8 NG/ML
GLUCOSE SERPL-MCNC: 205 MG/DL (ref 74–99)
HBA1C MFR BLD: 8.3 %
HCT VFR BLD AUTO: 38.3 % (ref 41–52)
HCV AB SER QL: NONREACTIVE
HDLC SERPL-MCNC: 33.8 MG/DL
HGB BLD-MCNC: 12.7 G/DL (ref 13.5–17.5)
IRON SATN MFR SERPL: 21 % (ref 25–45)
IRON SERPL-MCNC: 65 UG/DL (ref 35–150)
LDLC SERPL CALC-MCNC: 86 MG/DL
MCH RBC QN AUTO: 30.3 PG (ref 26–34)
MCHC RBC AUTO-ENTMCNC: 33.2 G/DL (ref 32–36)
MCV RBC AUTO: 91 FL (ref 80–100)
MICROALBUMIN UR-MCNC: 143.1 MG/L
MICROALBUMIN/CREAT UR: 132.7 UG/MG CREAT
NON HDL CHOLESTEROL: 102 MG/DL (ref 0–149)
NRBC BLD-RTO: 0 /100 WBCS (ref 0–0)
PLATELET # BLD AUTO: 289 X10*3/UL (ref 150–450)
POTASSIUM SERPL-SCNC: 4.6 MMOL/L (ref 3.5–5.3)
PROT SERPL-MCNC: 6.8 G/DL (ref 6.4–8.2)
RBC # BLD AUTO: 4.19 X10*6/UL (ref 4.5–5.9)
SODIUM SERPL-SCNC: 140 MMOL/L (ref 136–145)
TIBC SERPL-MCNC: 314 UG/DL (ref 240–445)
TRANSFERRIN SERPL-MCNC: 244 MG/DL (ref 200–360)
TRIGL SERPL-MCNC: 79 MG/DL (ref 0–149)
UIBC SERPL-MCNC: 249 UG/DL (ref 110–370)
VIT B12 SERPL-MCNC: 221 PG/ML (ref 211–911)
VLDL: 16 MG/DL (ref 0–40)
WBC # BLD AUTO: 4.3 X10*3/UL (ref 4.4–11.3)

## 2024-05-07 PROCEDURE — 85027 COMPLETE CBC AUTOMATED: CPT

## 2024-05-07 PROCEDURE — 3075F SYST BP GE 130 - 139MM HG: CPT | Performed by: FAMILY MEDICINE

## 2024-05-07 PROCEDURE — 80061 LIPID PANEL: CPT

## 2024-05-07 PROCEDURE — G0439 PPPS, SUBSEQ VISIT: HCPCS | Performed by: FAMILY MEDICINE

## 2024-05-07 PROCEDURE — 1123F ACP DISCUSS/DSCN MKR DOCD: CPT | Performed by: FAMILY MEDICINE

## 2024-05-07 PROCEDURE — 3060F POS MICROALBUMINURIA REV: CPT | Performed by: FAMILY MEDICINE

## 2024-05-07 PROCEDURE — 84466 ASSAY OF TRANSFERRIN: CPT

## 2024-05-07 PROCEDURE — 83550 IRON BINDING TEST: CPT

## 2024-05-07 PROCEDURE — 82746 ASSAY OF FOLIC ACID SERUM: CPT

## 2024-05-07 PROCEDURE — 86803 HEPATITIS C AB TEST: CPT

## 2024-05-07 PROCEDURE — 1160F RVW MEDS BY RX/DR IN RCRD: CPT | Performed by: FAMILY MEDICINE

## 2024-05-07 PROCEDURE — 1159F MED LIST DOCD IN RCRD: CPT | Performed by: FAMILY MEDICINE

## 2024-05-07 PROCEDURE — 80053 COMPREHEN METABOLIC PANEL: CPT

## 2024-05-07 PROCEDURE — 3079F DIAST BP 80-89 MM HG: CPT | Performed by: FAMILY MEDICINE

## 2024-05-07 PROCEDURE — 4010F ACE/ARB THERAPY RXD/TAKEN: CPT | Performed by: FAMILY MEDICINE

## 2024-05-07 PROCEDURE — 82607 VITAMIN B-12: CPT

## 2024-05-07 PROCEDURE — 1170F FXNL STATUS ASSESSED: CPT | Performed by: FAMILY MEDICINE

## 2024-05-07 PROCEDURE — 3052F HG A1C>EQUAL 8.0%<EQUAL 9.0%: CPT | Performed by: FAMILY MEDICINE

## 2024-05-07 PROCEDURE — 82043 UR ALBUMIN QUANTITATIVE: CPT

## 2024-05-07 PROCEDURE — 83540 ASSAY OF IRON: CPT

## 2024-05-07 PROCEDURE — 82570 ASSAY OF URINE CREATININE: CPT

## 2024-05-07 PROCEDURE — 82728 ASSAY OF FERRITIN: CPT

## 2024-05-07 PROCEDURE — 36415 COLL VENOUS BLD VENIPUNCTURE: CPT

## 2024-05-07 PROCEDURE — 3048F LDL-C <100 MG/DL: CPT | Performed by: FAMILY MEDICINE

## 2024-05-07 PROCEDURE — 83036 HEMOGLOBIN GLYCOSYLATED A1C: CPT

## 2024-05-07 ASSESSMENT — ACTIVITIES OF DAILY LIVING (ADL)
GROCERY_SHOPPING: INDEPENDENT
DOING_HOUSEWORK: INDEPENDENT
MANAGING_FINANCES: INDEPENDENT
DRESSING: INDEPENDENT
BATHING: INDEPENDENT
TAKING_MEDICATION: INDEPENDENT

## 2024-05-07 ASSESSMENT — ENCOUNTER SYMPTOMS
LOSS OF SENSATION IN FEET: 0
OCCASIONAL FEELINGS OF UNSTEADINESS: 0
DEPRESSION: 0

## 2024-05-07 ASSESSMENT — PATIENT HEALTH QUESTIONNAIRE - PHQ9
SUM OF ALL RESPONSES TO PHQ9 QUESTIONS 1 AND 2: 0
2. FEELING DOWN, DEPRESSED OR HOPELESS: NOT AT ALL
1. LITTLE INTEREST OR PLEASURE IN DOING THINGS: NOT AT ALL

## 2024-05-07 NOTE — PROGRESS NOTES
"Subjective   Reason for Visit: Carlos Vallejo is an 73 y.o. male here for a Medicare Wellness visit.     Past Medical, Surgical, and Family History reviewed and updated in chart.    HPI    Patient Self Assessment of Health Status  Patient Self Assessment: Good    Nutrition and Exercise  Current Diet: Well Balanced Diet  Adequate Fluid Intake: Yes  Caffeine: Yes  Exercise Frequency: Regularly    Functional Ability/Level of Safety  Cognitive Impairment Observed: No cognitive impairment observed  Cognitive Impairment Reported: No cognitive impairment reported by patient or family    Home Safety Risk Factors: None    H/O DM, CKD.  Taking med(s) as directed.  Requests refills.    H/O HLD, HTN.  Tx by cardiology.    Did not have fasting labs drawn prior to visit as ordered.    Patient Care Team:  Sergei Dowell MD as PCP - General     Review of Systems  Cyst on chest x 4 yrs.  Will follow up for eval.  Occ clear rectal discharge after colonoscopy.  Will contact GI for eval.    Objective   Vitals:  /82   Pulse 74   Resp 12   Ht 1.727 m (5' 8\")   Wt 86.2 kg (190 lb)   SpO2 99%   BMI 28.89 kg/m²       Physical Exam  Constitutional:       General: He is not in acute distress.     Appearance: He is overweight.   Musculoskeletal:      Comments: Ambulating w/o assistance   Neurological:      Mental Status: He is oriented to person, place, and time.   Psychiatric:         Mood and Affect: Mood normal.         Behavior: Behavior normal.     Assessment/Plan   Diagnoses and all orders for this visit:  Medicare annual wellness visit, subsequent  Type 2 diabetes mellitus with stage 3a chronic kidney disease, without long-term current use of insulin ([facility])  -     Referral to Ophthalmology; Future  -     Referral to Podiatry; Future  Cigarette nicotine dependence with nicotine-induced disorder  -     CT lung screening low dose; Future  Aortic valve sclerosis        -     Tx y cardiology  Coronary artery disease, " unspecified vessel or lesion type, unspecified whether angina present, unspecified whether native or transplanted heart        -     Tx y cardiology  Primary hypertension        -     Tx y cardiology  Hyperlipidemia, unspecified hyperlipidemia type        -     Tx y cardiology    Risk factors identified during visit:   Tobacco: Low dose CT chest ordered for lung cancer screening  BMI<18.5 or >25: Recommend weight loss efforts (see www.yourweightmatters.org/category/nutrition for ideas)  BMI<18.5 or >25: Patient declines nutrition referrals  Falls (high risk): Regular physical activity such as walking, water aerobics or genet chi to improve strength, balance, coordination, and flexibility.  Wear appropriate, sensible shoes.  Remove fall hazards at home, such as loose rugs, obstacles.  Use non-slip surface in bath or shower.  Keep living space well lit.  Use assistive device such as cane or walker.  Use handrails on stairs, grab bars for shower or tub, raised toilet seats and seat in the shower or tub.    Flu shot: Up to date (per patient).  PPSV23: Up to date.  PCV13: Up to date.  PCV20: N/A.  Shingrix: Recommended (local pharmacy)  Colon cancer screening: Up to date (2023, repeat in 3 yrs).  AAA screening: Up to date.  HIV screening: N/A.  Hepatitis C screening: Prevoiusly ordered.  Prostate cancer screening: N/A.    Recommend living will, health care power of .    Fasting lab previously ordered.  Will refill Jardiance after reviewing lab results.  Ophthalmology (eye exam) and podiatry (foot exam) referrals.  Follow up with cardiology as directed.    F/U 6 months: Med refills.

## 2024-05-07 NOTE — PATIENT INSTRUCTIONS
Risk factors identified during visit:   Tobacco: Low dose CT chest ordered for lung cancer screening  BMI<18.5 or >25: Recommend weight loss efforts (see www.yourweightmatters.org/category/nutrition for ideas)  BMI<18.5 or >25: Patient declines nutrition referrals  Falls (high risk): Regular physical activity such as walking, water aerobics or genet chi to improve strength, balance, coordination, and flexibility.  Wear appropriate, sensible shoes.  Remove fall hazards at home, such as loose rugs, obstacles.  Use non-slip surface in bath or shower.  Keep living space well lit.  Use assistive device such as cane or walker.  Use handrails on stairs, grab bars for shower or tub, raised toilet seats and seat in the shower or tub.    Flu shot: Up to date (per patient).  PPSV23: Up to date.  PCV13: Up to date.  PCV20: N/A.  Shingrix: Recommended (local pharmacy)  Colon cancer screening: Up to date (2023, repeat in 3 yrs).  AAA screening: Up to date.  HIV screening: N/A.  Hepatitis C screening: Prevoiusly ordered.  Prostate cancer screening: N/A.    Recommend living will, health care power of .    Fasting lab previously ordered.  Will refill Jardiance after reviewing lab results.  Ophthalmology (eye exam) and podiatry (foot exam) referrals.  Follow up with cardiology as directed.    F/U 6 months: Med refills.    Lab services: [address]  Hours: M-F 6:30a-6p, Sat 8a-12p  Phone: [phone], [address]

## 2024-05-07 NOTE — PROGRESS NOTES
"Subjective   Reason for Visit: Carlos Vallejo is an 73 y.o. male here for a Medicare Wellness visit.     Past Medical, Surgical, and Family History reviewed and updated in chart.    Reviewed all medications by prescribing practitioner or clinical pharmacist (such as prescriptions, OTCs, herbal therapies and supplements) and documented in the medical record.    HPI    Patient Care Team:  Sergei Dowell MD as PCP - General     Review of Systems    Objective   Vitals:  BP (!) 144/94   Pulse 74   Resp 12   Ht 1.727 m (5' 8\")   Wt 86.2 kg (190 lb)   SpO2 99%   BMI 28.89 kg/m²       Physical Exam    Assessment/Plan   Problem List Items Addressed This Visit    None  Visit Diagnoses       Medicare annual wellness visit, subsequent    -  Primary               "

## 2024-05-14 ENCOUNTER — HOSPITAL ENCOUNTER (OUTPATIENT)
Dept: RADIOLOGY | Facility: CLINIC | Age: 74
Discharge: HOME | End: 2024-05-14
Payer: MEDICARE

## 2024-05-14 DIAGNOSIS — F17.219 CIGARETTE NICOTINE DEPENDENCE WITH NICOTINE-INDUCED DISORDER: ICD-10-CM

## 2024-05-14 PROCEDURE — 71271 CT THORAX LUNG CANCER SCR C-: CPT

## 2024-05-18 DIAGNOSIS — I10 ESSENTIAL (PRIMARY) HYPERTENSION: Primary | ICD-10-CM

## 2024-05-20 RX ORDER — LOSARTAN POTASSIUM 100 MG/1
100 TABLET ORAL DAILY
Qty: 90 TABLET | Refills: 0 | Status: SHIPPED | OUTPATIENT
Start: 2024-05-20 | End: 2024-05-24 | Stop reason: SDUPTHER

## 2024-05-24 ENCOUNTER — OFFICE VISIT (OUTPATIENT)
Dept: CARDIOLOGY | Facility: HOSPITAL | Age: 74
End: 2024-05-24
Payer: MEDICARE

## 2024-05-24 VITALS
HEIGHT: 68 IN | BODY MASS INDEX: 29.4 KG/M2 | DIASTOLIC BLOOD PRESSURE: 93 MMHG | HEART RATE: 65 BPM | WEIGHT: 194 LBS | SYSTOLIC BLOOD PRESSURE: 168 MMHG | OXYGEN SATURATION: 99 %

## 2024-05-24 DIAGNOSIS — E78.5 HYPERLIPIDEMIA, UNSPECIFIED: ICD-10-CM

## 2024-05-24 DIAGNOSIS — I10 PRIMARY HYPERTENSION: ICD-10-CM

## 2024-05-24 DIAGNOSIS — N18.31 TYPE 2 DIABETES MELLITUS WITH STAGE 3A CHRONIC KIDNEY DISEASE, WITHOUT LONG-TERM CURRENT USE OF INSULIN (MULTI): ICD-10-CM

## 2024-05-24 DIAGNOSIS — I25.10 CORONARY ARTERY DISEASE INVOLVING NATIVE CORONARY ARTERY OF NATIVE HEART WITHOUT ANGINA PECTORIS: Primary | ICD-10-CM

## 2024-05-24 DIAGNOSIS — I10 ESSENTIAL (PRIMARY) HYPERTENSION: ICD-10-CM

## 2024-05-24 DIAGNOSIS — E78.5 HYPERLIPIDEMIA, UNSPECIFIED HYPERLIPIDEMIA TYPE: ICD-10-CM

## 2024-05-24 DIAGNOSIS — E11.22 TYPE 2 DIABETES MELLITUS WITH STAGE 3A CHRONIC KIDNEY DISEASE, WITHOUT LONG-TERM CURRENT USE OF INSULIN (MULTI): ICD-10-CM

## 2024-05-24 PROCEDURE — 99214 OFFICE O/P EST MOD 30 MIN: CPT | Performed by: NURSE PRACTITIONER

## 2024-05-24 PROCEDURE — 3052F HG A1C>EQUAL 8.0%<EQUAL 9.0%: CPT | Performed by: NURSE PRACTITIONER

## 2024-05-24 PROCEDURE — 3077F SYST BP >= 140 MM HG: CPT | Performed by: NURSE PRACTITIONER

## 2024-05-24 PROCEDURE — 1160F RVW MEDS BY RX/DR IN RCRD: CPT | Performed by: NURSE PRACTITIONER

## 2024-05-24 PROCEDURE — 3048F LDL-C <100 MG/DL: CPT | Performed by: NURSE PRACTITIONER

## 2024-05-24 PROCEDURE — 1036F TOBACCO NON-USER: CPT | Performed by: NURSE PRACTITIONER

## 2024-05-24 PROCEDURE — 4010F ACE/ARB THERAPY RXD/TAKEN: CPT | Performed by: NURSE PRACTITIONER

## 2024-05-24 PROCEDURE — 1123F ACP DISCUSS/DSCN MKR DOCD: CPT | Performed by: NURSE PRACTITIONER

## 2024-05-24 PROCEDURE — 1159F MED LIST DOCD IN RCRD: CPT | Performed by: NURSE PRACTITIONER

## 2024-05-24 PROCEDURE — 3060F POS MICROALBUMINURIA REV: CPT | Performed by: NURSE PRACTITIONER

## 2024-05-24 PROCEDURE — 93005 ELECTROCARDIOGRAM TRACING: CPT | Performed by: NURSE PRACTITIONER

## 2024-05-24 PROCEDURE — 3080F DIAST BP >= 90 MM HG: CPT | Performed by: NURSE PRACTITIONER

## 2024-05-24 RX ORDER — ATORVASTATIN CALCIUM 40 MG/1
40 TABLET, FILM COATED ORAL DAILY
Qty: 90 TABLET | Refills: 3 | Status: SHIPPED | OUTPATIENT
Start: 2024-05-24

## 2024-05-24 RX ORDER — CLONIDINE HYDROCHLORIDE 0.1 MG/1
0.1 TABLET ORAL 2 TIMES DAILY
Qty: 180 TABLET | Refills: 3 | Status: SHIPPED | OUTPATIENT
Start: 2024-05-24

## 2024-05-24 RX ORDER — METOPROLOL SUCCINATE 25 MG/1
25 TABLET, EXTENDED RELEASE ORAL DAILY
Qty: 90 TABLET | Refills: 3 | Status: SHIPPED | OUTPATIENT
Start: 2024-05-24 | End: 2025-05-24

## 2024-05-24 RX ORDER — EZETIMIBE 10 MG/1
10 TABLET ORAL DAILY
Qty: 90 TABLET | Refills: 3 | Status: SHIPPED | OUTPATIENT
Start: 2024-05-24

## 2024-05-24 RX ORDER — LOSARTAN POTASSIUM 100 MG/1
100 TABLET ORAL DAILY
Qty: 90 TABLET | Refills: 3 | Status: SHIPPED | OUTPATIENT
Start: 2024-05-24

## 2024-05-24 RX ORDER — AMLODIPINE BESYLATE 10 MG/1
10 TABLET ORAL DAILY
Qty: 90 TABLET | Refills: 3 | Status: SHIPPED | OUTPATIENT
Start: 2024-05-24

## 2024-05-24 ASSESSMENT — ENCOUNTER SYMPTOMS: HYPERTENSION: 1

## 2024-05-24 NOTE — PROGRESS NOTES
Subjective   Carlos Vallejo is a 73 y.o. male.    Chief Complaint:  Coronary Artery Disease, Hyperlipidemia, and Hypertension    Mr. Vallejo returns for a follow up. He has not been seen in over a year and a half. He comes in today needing his medications refilled. He has been out of amlodipine for 2 weeks, and has not been on metoprolol for quite some time. He uses a cane to help with his ambulation, and admits to slowing down some, though denies any chest pain or shortness of breath at his current activity level. He denies any recent ER visits or hospitalizations. He offers no specific cardiovascular complaints or concerns today. He denies any complaints of chest pain, shortness of breath, lightheadedness, dizziness, palpitations, syncope, orthopnea, paroxysmal nocturnal dyspnea, lower extremity swelling or bleeding concerns.      Coronary Artery Disease  Risk factors include hyperlipidemia.   Hyperlipidemia    Hypertension    Heart Murmur      Review of Systems   All other systems reviewed and are negative.      Objective   Physical Exam  Constitutional:       Appearance: Healthy appearance. In no distress  Pulmonary:      Effort: Pulmonary effort is normal.      Breath sounds: Normal breath sounds.   Cardiovascular:      Normal rate. Regular rhythm. Normal S1. Normal S2.       Murmurs: There is no murmur.      Carotids: right carotid pulse +2, no bruit heard over the right carotid. left carotid pulse +2, no bruit heard over the left carotid.  Edema:     Peripheral edema absent.   Abdominal:      Palpations: Abdomen is soft.   Musculoskeletal:       Cervical back: Normal range of motion.   Skin:     General: Skin is warm and dry. Normal color and pigmentation   Neurological:      Mental Status: Alert and oriented to person, place and time.   Psychiatric:     Mood and Affect: appropriate mood and appropriate affect.     EKG obtained and reviewed. Normal sinus rhythm. Right bundle branch block. HR 65.      Lab Review:    Lab Results   Component Value Date     05/07/2024    K 4.6 05/07/2024     05/07/2024    CO2 26 05/07/2024    BUN 16 05/07/2024    CREATININE 1.44 (H) 05/07/2024    GLUCOSE 205 (H) 05/07/2024    CALCIUM 9.0 05/07/2024     Lab Results   Component Value Date    WBC 4.3 (L) 05/07/2024    HGB 12.7 (L) 05/07/2024    HCT 38.3 (L) 05/07/2024    MCV 91 05/07/2024     05/07/2024     Lab Results   Component Value Date    CHOL 136 05/07/2024    TRIG 79 05/07/2024    HDL 33.8 05/07/2024       Assessment/Plan   Mr. Vallejo is a very pleasant 72 year old  male with a past medical history significant for hypertension, hyperlipidemia, T2DM, colon cancer and an elevated CACS of 752.51. Cardiac cath 10/2021 showed single vessel and branch coronary artery disease with normal LV systolic function. Echocardiogram 8/2021 showed an EF of 50-55% with aortic valve sclerosis. He has a true aspirin allergy with anaphylaxis. He was previously on plavix, though this was stopped quite some time ago due to rectal bleeding ?related to his colon cancer. He presents today for routine follow up stable from a cardiac standpoint. His EKG remains stable. His BP is elevated, though he has been out of some of his medications. He seems to be getting around reasonably well, denying any exertional chest pain or shortness of breath. I will have him continue all medications unchanged, though reorder metoprolol at a lower dose due to his HR being 65 bpm today. He will follow up with us in clinic in one month for a BP check. At that time we will review if we should reconsider restarting plavix. We had previously discussed aspirin desensitization, though he was not interested in this due to occasionally missing doses of his other medications, and fear of missing aspirin and having a reaction once restarted. We also briefly discussed a PCSK9 inhibitor for his cholesterol, though he does not want to add another medication at this  time. He knows to call for any concerns.

## 2024-05-26 DIAGNOSIS — D72.819 LEUKOPENIA, UNSPECIFIED TYPE: ICD-10-CM

## 2024-05-26 DIAGNOSIS — R93.89 ABNORMAL CT SCAN, CHEST: ICD-10-CM

## 2024-05-26 DIAGNOSIS — F17.219 CIGARETTE NICOTINE DEPENDENCE WITH NICOTINE-INDUCED DISORDER: ICD-10-CM

## 2024-05-26 DIAGNOSIS — N18.31 TYPE 2 DIABETES MELLITUS WITH STAGE 3A CHRONIC KIDNEY DISEASE, WITHOUT LONG-TERM CURRENT USE OF INSULIN (MULTI): Primary | ICD-10-CM

## 2024-05-26 DIAGNOSIS — E11.22 TYPE 2 DIABETES MELLITUS WITH STAGE 3A CHRONIC KIDNEY DISEASE, WITHOUT LONG-TERM CURRENT USE OF INSULIN (MULTI): Primary | ICD-10-CM

## 2024-05-29 LAB
ATRIAL RATE: 65 BPM
P AXIS: 8 DEGREES
P OFFSET: 197 MS
P ONSET: 133 MS
PR INTERVAL: 180 MS
Q ONSET: 223 MS
QRS COUNT: 10 BEATS
QRS DURATION: 138 MS
QT INTERVAL: 430 MS
QTC CALCULATION(BAZETT): 447 MS
QTC FREDERICIA: 441 MS
R AXIS: -29 DEGREES
T AXIS: -8 DEGREES
T OFFSET: 438 MS
VENTRICULAR RATE: 65 BPM

## 2024-06-26 ENCOUNTER — APPOINTMENT (OUTPATIENT)
Dept: CARDIOLOGY | Facility: HOSPITAL | Age: 74
End: 2024-06-26
Payer: MEDICARE

## 2024-07-23 ENCOUNTER — PATIENT OUTREACH (OUTPATIENT)
Dept: CARE COORDINATION | Facility: CLINIC | Age: 74
End: 2024-07-23
Payer: MEDICARE

## 2024-07-23 ENCOUNTER — DOCUMENTATION (OUTPATIENT)
Dept: CARE COORDINATION | Facility: CLINIC | Age: 74
End: 2024-07-23
Payer: MEDICARE

## 2024-07-23 NOTE — PROGRESS NOTES
Discharge Facility: [facility]  Discharge Diagnosis: Type 2 diabetes mellitus with diabetic peripheral angiopathy without gangrene, INFECTIOUS AND PARASITIC DISEASES WITH O.R. PROCEDURES WITH ml, Osteomyelitis, unspecified  Admission Date: 6/10/24  Discharge Date: 7/20/24    PCP Appointment Date: 7/25/24  Specialist Appointment Date: needs podiatry  Hospital Encounter and Summary Linked: No  See discharge assessment below for further details    Medications  Medications reviewed with patient/caregiver?: Yes (7/23/2024 10:36 AM)  Is the patient having any side effects they believe may be caused by any medication additions or changes?: No (7/23/2024 10:36 AM)  Does the patient have all medications ordered at discharge?: No (7/23/2024 10:36 AM)  Medication Comments: no discharge medication list available per SNF discharge. Per ID note patient started on augmentin 875/125mg bid for 2 weeks. Patient is awaiting pharmacy to provide jardiance. (7/23/2024 10:36 AM)    Appointments  Does the patient have a primary care provider?: Yes (7/23/2024 10:36 AM)  Care Management Interventions: Verified appointment date/time/provider (7/23/2024 10:36 AM)  Care Management Interventions: Advised patient to keep appointment (7/23/2024 10:36 AM)    Self Management  What is the home health agency?: n/a (7/23/2024 10:36 AM)  What Durable Medical Equipment (DME) was ordered?: n/a - has walker, cane (7/23/2024 10:36 AM)    Patient Teaching  Does the patient have access to their discharge instructions?: No (patient states he was not given any paperwork from SNF) (7/23/2024 10:36 AM)  What is the patient's perception of their health status since discharge?: Same (7/23/2024 10:36 AM)  Patient/Caregiver Education Comments: Patient states he is managing okay throughout the home using a cane for ambulation. He lives in a trailer so he is unable to use wheelchair. He does also have a walker if needed. Patient states he intended to use provide  a ride for transportation as instructed but then was told he does not qualify. He missed podiatry appt yesterday. He expresses both his wife and son have passed away in the last few months. His brother is helping him with transportation as able but he is interested in available resources. SW task sent. Patient states he did have son's room cleaned (where he passed away while patient was hospitalized) and person is returning today to filter the air. (7/23/2024 10:36 AM)

## 2024-07-24 ENCOUNTER — PATIENT OUTREACH (OUTPATIENT)
Dept: CARE COORDINATION | Facility: CLINIC | Age: 74
End: 2024-07-24
Payer: MEDICARE

## 2024-07-24 NOTE — PROGRESS NOTES
Outreach call to patient per task from ALOK Nieves RN. Patient reports he was discharged from SNF after insurance declined appeal lengthening stay. Patient reports he has an open wound on his foot that he is trying to keep clean and bandaged on his own. Patient reports he lives alone in trailer: wife passed away in April 2024, son passed away in trailer in June 2024 while patient was in SNF. Patient reports his  and ’s wife picked him up Saturday and brought him home. Patient reports his brother/Dimitris Vallejo [phone] assisted with hiring/paying for cleaning company to come to trailer and clean room where his son passed away. Patient reports his brother has visited every day this week to assist. Patient cites granddaughter, younger brother//’s wife as sources of support. Patient cites his brigitte in God as sustaining him through losses and denials. Patient reports he can get himself to the bathroom and has been making food with assistance of cane and hopping (can put pressure on heel of right foot). Patient reports his neighbor assisted with fixing his car and he intends to drive himself to primary care appointment tomorrow. I expressed concern over safety of his driving, patient reports he drove earlier this week. Patient seeking RN to come to home and assist with wound care. I inquired if patient has had any home visits for medical or social work services since discharge from SNF, patient stated he has not. Patient gave verbal consent for me to reach out to  at [facility] to inquire about any post-discharge services, patient agreed.      Telephone call to [facility] ([phone]), spoke with ARAMIS Parra who reports patient was declined first round appeal, could have been discharge 7/21/24 and made second appeal. Aida states patient declined second appeal and signed out AMA 7/20/24. Aida states she made a report to Adult Protective Services. Aida states she has not  received any feedback from APS regarding case at this time. I called [facility] APS [phone], spoke to Trena Pennington at intake to share concerns regarding patient’s safety at home, ability to care for himself, concerns about driving. Ms. Pennington confirmed a referral had been made for patient earlier this week and she would share my information with the worker assigned to patient’s case.

## 2024-07-25 ENCOUNTER — DOCUMENTATION (OUTPATIENT)
Dept: CARE COORDINATION | Facility: CLINIC | Age: 74
End: 2024-07-25

## 2024-07-25 ENCOUNTER — OFFICE VISIT (OUTPATIENT)
Dept: PRIMARY CARE | Facility: CLINIC | Age: 74
End: 2024-07-25
Payer: MEDICARE

## 2024-07-25 VITALS
OXYGEN SATURATION: 98 % | HEIGHT: 68 IN | RESPIRATION RATE: 16 BRPM | SYSTOLIC BLOOD PRESSURE: 131 MMHG | HEART RATE: 81 BPM | DIASTOLIC BLOOD PRESSURE: 67 MMHG | BODY MASS INDEX: 25.91 KG/M2 | WEIGHT: 171 LBS

## 2024-07-25 DIAGNOSIS — Z89.421: ICD-10-CM

## 2024-07-25 DIAGNOSIS — M86.9 OSTEOMYELITIS OF RIGHT FOOT, UNSPECIFIED TYPE (MULTI): ICD-10-CM

## 2024-07-25 DIAGNOSIS — Z09 HOSPITAL DISCHARGE FOLLOW-UP: Primary | ICD-10-CM

## 2024-07-25 NOTE — PATIENT INSTRUCTIONS
Continue antibiotics.  Keep podiatry appointment tomorrow.  Follow up with wound care as advised.    F/U 4 months as previously advised: Med refills.

## 2024-07-25 NOTE — PROGRESS NOTES
"Subjective   Patient ID: Carlos Vallejo is a 73 y.o. male who presents for Hospital Follow-up.    HPI   Hospitalized 24 to 6/10/24 for osteomyelitis right 4th toe.  Treatments: IV Abx x 6 wks (stopped 24).  Procedures: Partial right 4th toe amputation, wound debridement.  Medication changes: N/A  Discharged to SNF for long term Abx.      Was in SNF 6/10/24 to 24 for IV Abx.  Since SNF discharge:  States he saw 3 different podiatrists for follow up.  Prescribed Augmentin po x 2 wks (taking as directed).  \[facility] [place] [facility]\"" last podiatry visit was last week.  Was told that the wound looked good at that time.  Has follow up appointment w/podiatry tomorrow.  States wound looks the same as it did at last podiatry appointment.  Has not seen wound care yet.    Discharge summary was reviewed (hospital).  No discharge summary available from [facility].  Transitional Care Management documentation was reviewed.   Medication reconciliation was performed as indicated via the \"Rupesh as Reviewed\" timestamp.   The complexity of medical decision making for this patient's transitional care is moderate.    Of note:  Wife and son  since last visit.  Declined referral for grief counseling or other psych services.    Review of Systems  No other complaints.     Objective   /67   Pulse 81   Resp 16   Ht 1.727 m (5' 8\")   Wt 77.6 kg (171 lb)   SpO2 98%   BMI 26.00 kg/m²     Physical Exam  Constitutional:       General: He is not in acute distress.     Appearance: He is overweight.   Musculoskeletal:      Comments: Ambulating w/cane   Feet:      Comments: Open wound right foot, s/p right 4th toe amputation  Neurological:      Mental Status: He is oriented to person, place, and time.   Psychiatric:         Mood and Affect: Mood normal.         Behavior: Behavior normal.     Assessment/Plan   Diagnoses and all orders for this visit:  Hospital discharge follow-up  Osteomyelitis of right foot, unspecified type (Multi)        -     " Stable based on patient report and Sx.  Continue established Tx plan.  F/U plans documented in the encounter note.    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  Continue established Tx plan.  F/U plans documented in the encounter note.     Continue antibiotics.  Keep podiatry appointment tomorrow.  Follow up with wound care as advised.    F/U 4 months as previously advised: Med refills.

## 2024-07-25 NOTE — PROGRESS NOTES
Received call from EDITH Joy assigned to patient's case, left voicemail.    Returned Ms. Grullon's call. Ms. Grullon reports patient's case is active, she has not met patient yet. I shared my concerns regarding patient's safety at home, ability to care for himself with limited mobility (particularly wound care for foot), concerns about patient driving. Ms. Grullon will follow up with me if patient engages and consents to share information.     KOKO Schrader   III  [facility] Health/Accountable Care Organization  Office Phone: [phone]  [email or web address]

## 2024-07-31 ENCOUNTER — PATIENT OUTREACH (OUTPATIENT)
Dept: CARE COORDINATION | Facility: CLINIC | Age: 74
End: 2024-07-31
Payer: MEDICARE

## 2024-07-31 NOTE — PROGRESS NOTES
Outreach call to patient. No answer, voicemail not set up.    KOKO Schrader   III  [facility] Health/Accountable Care Organization  Office Phone: [phone]  [email or web address]

## 2024-08-02 ENCOUNTER — PATIENT OUTREACH (OUTPATIENT)
Dept: CARE COORDINATION | Facility: CLINIC | Age: 74
End: 2024-08-02
Payer: MEDICARE

## 2024-08-02 NOTE — PROGRESS NOTES
Outreach call to patient. Patient shared ongoing concerns about wound on his foot. Patient reports he saw PCP 7/25/24 and podiatry 7/26/24 (reports he goes to podiatrist on [address] in [place]; notes not in EPIC). Next podiatry appointment is scheduled for 8/9/24. Patient states wound is still draining, no fever. Patient reports he has changed bandage 3 times this week on his own. Patient states podiatrist did not provide any medication. I encouraged patient to call podiatry office and share concerns. Patient agreed.   Patient shared that a  named Kika did visit him this week. As of now, patient is not receiving any in-home services. Patient reports he has food through [employer] and denied food insecurity.     I called EDITH Joy . Ms. Grullon confirmed patient's case is active and he agreed to services. Ms. Grullon states patient signed an JUANITO for [facility], she will send via email. Ms. Grullon requested I assist with reaching out to patient's PCP and requesting home care referral for skilled nursing wound care. Secure chat sent to Dr. Dowell with request.    KOKO Schrader   III   Population Health/Accountable Care Organization  Office Phone: [phone]  [email or web address]

## 2024-08-05 ENCOUNTER — DOCUMENTATION (OUTPATIENT)
Dept: CARE COORDINATION | Facility: CLINIC | Age: 74
End: 2024-08-05
Payer: MEDICARE

## 2024-08-05 NOTE — PROGRESS NOTES
1 month f/u, video visit acceptable Call with EDITH Joy  regarding coordinating referral for home care with patient's PCP.     Secure chat with Dr. Dowell who requested I reach out to SNF to inquire if orders were placed upon discharge. I spoke with staff at [facility] ([phone])who relayed that patient left AMA, no orders placed.     Share information with Dr. Dowell, clarifying per APS SW that patient is seeking assistance with wound care on right foot; may need PT as well.     Will await response and continue to follow.      KOKO Schrader   III   Population Health/Accountable Care Organization  Office Phone: [phone]  [email or web address]

## 2024-08-07 ENCOUNTER — DOCUMENTATION (OUTPATIENT)
Dept: CARE COORDINATION | Facility: CLINIC | Age: 74
End: 2024-08-07
Payer: MEDICARE

## 2024-08-07 NOTE — PROGRESS NOTES
Uploaded Authorization and Consent for Release of Information from [facility] Division of Senior and Adult Services/Adult Protective Services signed by patient 7-     KOKO Schrader   III  [facility] Health/Accountable Care Organization  Office Phone: [phone]  [email or web address]

## 2024-08-13 ENCOUNTER — PATIENT OUTREACH (OUTPATIENT)
Dept: CARE COORDINATION | Facility: CLINIC | Age: 74
End: 2024-08-13
Payer: MEDICARE

## 2024-08-13 NOTE — PROGRESS NOTES
Call regarding appt. with PCP on (7/25/24) after hospitalization.  At time of outreach call the patient feels as if their condition has (remained the same) since last visit.  Reviewed the PCP appointment with the pt and addressed any questions or concerns.  Patient has been following with podiatry who gave him further antibiotics and wound care supplies. Patient continues to change dressing on his own at home, unable to see the wound on the bottom of his foot. Attempting to get home health care nursing for patient.    8/15/24 - Unable to reach APS ARAMIS Anand, she is out of office until 8/20/24. Unable to reach staff at [facility] voicemail message left requesting return call.  8/16/24 1010 - spoke with  at [facility] she will contact doctor requesting that home care be ordered.

## 2024-08-14 ENCOUNTER — DOCUMENTATION (OUTPATIENT)
Dept: CARE COORDINATION | Facility: CLINIC | Age: 74
End: 2024-08-14
Payer: MEDICARE

## 2024-08-14 NOTE — PROGRESS NOTES
[facility], [facility] and Dr. Bacon's coordinator [phone], in attempt to cancel surgery. I was unable to get ahold of anyone.  I left a message for his coordinator to return my call.     Jeana Do, Pediatric      Telephone call with EDITH Joy . I encouraged Ms. Grullon to contact Kasia Nieves RN providing transitional care management for patient to coordinate homecare orders. I shared Dr. Dowell's instructions to reach out to podiatry or wound care for homecare orders. Communicated with Kasia Nieves via secure chat to share Ms. Grullon's contact information as well.     KOKO Schrader   III   Population Health/Accountable Care Organization  Office Phone: [phone]

## 2024-08-22 ENCOUNTER — APPOINTMENT (OUTPATIENT)
Dept: PODIATRY | Facility: HOSPITAL | Age: 74
End: 2024-08-22
Payer: MEDICARE

## 2024-09-05 ENCOUNTER — PATIENT OUTREACH (OUTPATIENT)
Dept: PRIMARY CARE | Facility: CLINIC | Age: 74
End: 2024-09-05
Payer: MEDICARE

## 2024-09-05 NOTE — PROGRESS NOTES
Successful outreach to patient regarding hospitalization as patient continues TCM program.   At time of outreach call the patient feels as if their condition has remained the same since initial visit with PCP or specialist.  Questions or concerns addressed at this time with patient.   Provided contact information to patient if any further non-emergent needs arise.     Patient reports podiatrist did not order home health care, he has been managing wound changes and is healing, though slowly due to the diabetes. Patient states he sees podiatrist again tomorrow, he did not have an appointment for two weeks this time. He states he will discuss home care needs again at appointment tomorrow though he is not sure he needs it. Encouraged patient to call if any questions or concerns.

## 2024-09-09 ENCOUNTER — APPOINTMENT (OUTPATIENT)
Dept: PRIMARY CARE | Facility: CLINIC | Age: 74
End: 2024-09-09
Payer: MEDICARE

## 2024-09-09 ENCOUNTER — LAB (OUTPATIENT)
Dept: LAB | Facility: LAB | Age: 74
End: 2024-09-09
Payer: MEDICARE

## 2024-09-09 VITALS
BODY MASS INDEX: 27.52 KG/M2 | HEIGHT: 68 IN | WEIGHT: 181.6 LBS | OXYGEN SATURATION: 97 % | DIASTOLIC BLOOD PRESSURE: 75 MMHG | HEART RATE: 77 BPM | RESPIRATION RATE: 16 BRPM | SYSTOLIC BLOOD PRESSURE: 128 MMHG

## 2024-09-09 DIAGNOSIS — N18.31 TYPE 2 DIABETES MELLITUS WITH STAGE 3A CHRONIC KIDNEY DISEASE, WITHOUT LONG-TERM CURRENT USE OF INSULIN (MULTI): ICD-10-CM

## 2024-09-09 DIAGNOSIS — D72.819 LEUKOPENIA, UNSPECIFIED TYPE: ICD-10-CM

## 2024-09-09 DIAGNOSIS — E11.22 TYPE 2 DIABETES MELLITUS WITH STAGE 3A CHRONIC KIDNEY DISEASE, WITHOUT LONG-TERM CURRENT USE OF INSULIN (MULTI): ICD-10-CM

## 2024-09-09 LAB
ANION GAP SERPL CALC-SCNC: 11 MMOL/L (ref 10–20)
BASOPHILS # BLD AUTO: 0.03 X10*3/UL (ref 0–0.1)
BASOPHILS NFR BLD AUTO: 0.6 %
BUN SERPL-MCNC: 25 MG/DL (ref 6–23)
CALCIUM SERPL-MCNC: 9.9 MG/DL (ref 8.6–10.6)
CHLORIDE SERPL-SCNC: 107 MMOL/L (ref 98–107)
CO2 SERPL-SCNC: 27 MMOL/L (ref 21–32)
CREAT SERPL-MCNC: 1.72 MG/DL (ref 0.5–1.3)
EGFRCR SERPLBLD CKD-EPI 2021: 41 ML/MIN/1.73M*2
EOSINOPHIL # BLD AUTO: 0.11 X10*3/UL (ref 0–0.4)
EOSINOPHIL NFR BLD AUTO: 2.1 %
ERYTHROCYTE [DISTWIDTH] IN BLOOD BY AUTOMATED COUNT: 13.3 % (ref 11.5–14.5)
EST. AVERAGE GLUCOSE BLD GHB EST-MCNC: 163 MG/DL
GLUCOSE SERPL-MCNC: 195 MG/DL (ref 74–99)
HBA1C MFR BLD: 7.3 %
HCT VFR BLD AUTO: 39.9 % (ref 41–52)
HGB BLD-MCNC: 12.7 G/DL (ref 13.5–17.5)
IMM GRANULOCYTES # BLD AUTO: 0.02 X10*3/UL (ref 0–0.5)
IMM GRANULOCYTES NFR BLD AUTO: 0.4 % (ref 0–0.9)
LYMPHOCYTES # BLD AUTO: 1.94 X10*3/UL (ref 0.8–3)
LYMPHOCYTES NFR BLD AUTO: 37.9 %
MCH RBC QN AUTO: 30.4 PG (ref 26–34)
MCHC RBC AUTO-ENTMCNC: 31.8 G/DL (ref 32–36)
MCV RBC AUTO: 96 FL (ref 80–100)
MONOCYTES # BLD AUTO: 0.52 X10*3/UL (ref 0.05–0.8)
MONOCYTES NFR BLD AUTO: 10.2 %
NEUTROPHILS # BLD AUTO: 2.5 X10*3/UL (ref 1.6–5.5)
NEUTROPHILS NFR BLD AUTO: 48.8 %
NRBC BLD-RTO: 0 /100 WBCS (ref 0–0)
PLATELET # BLD AUTO: 290 X10*3/UL (ref 150–450)
POTASSIUM SERPL-SCNC: 5 MMOL/L (ref 3.5–5.3)
RBC # BLD AUTO: 4.18 X10*6/UL (ref 4.5–5.9)
SODIUM SERPL-SCNC: 140 MMOL/L (ref 136–145)
WBC # BLD AUTO: 5.1 X10*3/UL (ref 4.4–11.3)

## 2024-09-09 PROCEDURE — 3074F SYST BP LT 130 MM HG: CPT | Performed by: FAMILY MEDICINE

## 2024-09-09 PROCEDURE — 1160F RVW MEDS BY RX/DR IN RCRD: CPT | Performed by: FAMILY MEDICINE

## 2024-09-09 PROCEDURE — 1159F MED LIST DOCD IN RCRD: CPT | Performed by: FAMILY MEDICINE

## 2024-09-09 PROCEDURE — 3078F DIAST BP <80 MM HG: CPT | Performed by: FAMILY MEDICINE

## 2024-09-09 PROCEDURE — 80048 BASIC METABOLIC PNL TOTAL CA: CPT

## 2024-09-09 PROCEDURE — 83036 HEMOGLOBIN GLYCOSYLATED A1C: CPT

## 2024-09-09 PROCEDURE — 1036F TOBACCO NON-USER: CPT | Performed by: FAMILY MEDICINE

## 2024-09-09 PROCEDURE — 85025 COMPLETE CBC W/AUTO DIFF WBC: CPT

## 2024-09-09 PROCEDURE — 1123F ACP DISCUSS/DSCN MKR DOCD: CPT | Performed by: FAMILY MEDICINE

## 2024-09-09 PROCEDURE — 99214 OFFICE O/P EST MOD 30 MIN: CPT | Performed by: FAMILY MEDICINE

## 2024-09-09 PROCEDURE — 36415 COLL VENOUS BLD VENIPUNCTURE: CPT

## 2024-09-09 ASSESSMENT — ENCOUNTER SYMPTOMS
LOSS OF SENSATION IN FEET: 0
DEPRESSION: 0
OCCASIONAL FEELINGS OF UNSTEADINESS: 0

## 2024-09-09 NOTE — PATIENT INSTRUCTIONS
Get nonfasting labs as previously ordered.  Will refill Jardiance after reviewing lab results.  Form signed for diabetes supplies (test daily).    F/U 3 months: Med refills.

## 2024-09-13 ENCOUNTER — APPOINTMENT (OUTPATIENT)
Dept: OPHTHALMOLOGY | Facility: CLINIC | Age: 74
End: 2024-09-13
Payer: MEDICARE

## 2024-09-13 DIAGNOSIS — H25.13 CATARACT, NUCLEAR SCLEROTIC, BOTH EYES: ICD-10-CM

## 2024-09-13 DIAGNOSIS — H52.223 REGULAR ASTIGMATISM OF BOTH EYES WITH PRESBYOPIA: ICD-10-CM

## 2024-09-13 DIAGNOSIS — H35.89 RPE MOTTLING OF MACULA: ICD-10-CM

## 2024-09-13 DIAGNOSIS — H52.4 REGULAR ASTIGMATISM OF BOTH EYES WITH PRESBYOPIA: ICD-10-CM

## 2024-09-13 DIAGNOSIS — N18.31 TYPE 2 DIABETES MELLITUS WITH STAGE 3A CHRONIC KIDNEY DISEASE, WITHOUT LONG-TERM CURRENT USE OF INSULIN (MULTI): ICD-10-CM

## 2024-09-13 DIAGNOSIS — E11.9 TYPE 2 DIABETES MELLITUS WITHOUT RETINOPATHY (MULTI): Primary | ICD-10-CM

## 2024-09-13 DIAGNOSIS — E11.22 TYPE 2 DIABETES MELLITUS WITH STAGE 3A CHRONIC KIDNEY DISEASE, WITHOUT LONG-TERM CURRENT USE OF INSULIN (MULTI): ICD-10-CM

## 2024-09-13 DIAGNOSIS — H25.043 POSTERIOR SUBCAPSULAR AGE-RELATED CATARACT, BOTH EYES: ICD-10-CM

## 2024-09-13 DIAGNOSIS — H52.13 BILATERAL MYOPIA: ICD-10-CM

## 2024-09-13 PROCEDURE — 92004 COMPRE OPH EXAM NEW PT 1/>: CPT | Performed by: STUDENT IN AN ORGANIZED HEALTH CARE EDUCATION/TRAINING PROGRAM

## 2024-09-13 PROCEDURE — 92015 DETERMINE REFRACTIVE STATE: CPT | Performed by: STUDENT IN AN ORGANIZED HEALTH CARE EDUCATION/TRAINING PROGRAM

## 2024-09-13 ASSESSMENT — REFRACTION_WEARINGRX
OD_SPHERE: -0.25
OS_AXIS: 086
SPECS_TYPE: BIFOCAL
OD_AXIS: 106
OS_ADD: +3.00
OS_CYLINDER: -0.75
OS_SPHERE: -0.75
OD_ADD: +3.00
OD_CYLINDER: -1.25

## 2024-09-13 ASSESSMENT — REFRACTION_MANIFEST
OD_ADD: +3.00
OS_CYLINDER: -1.00
OD_SPHERE: -0.50
OD_CYLINDER: -1.50
METHOD_AUTOREFRACTION: 1
OD_CYLINDER: -1.50
OD_AXIS: 109
OS_AXIS: 083
OD_AXIS: 105
OS_SPHERE: -0.50
OS_ADD: +3.00
OS_CYLINDER: -1.00
OS_AXIS: 075
OD_SPHERE: -0.50
OS_SPHERE: -1.25

## 2024-09-13 ASSESSMENT — EXTERNAL EXAM - RIGHT EYE: OD_EXAM: NORMAL

## 2024-09-13 ASSESSMENT — TONOMETRY
OS_IOP_MMHG: 14
IOP_METHOD: GOLDMANN APPLANATION
OD_IOP_MMHG: 14

## 2024-09-13 ASSESSMENT — ENCOUNTER SYMPTOMS
MUSCULOSKELETAL NEGATIVE: 0
ENDOCRINE NEGATIVE: 1
HEMATOLOGIC/LYMPHATIC NEGATIVE: 0
PSYCHIATRIC NEGATIVE: 0
EYES NEGATIVE: 1
RESPIRATORY NEGATIVE: 0
CARDIOVASCULAR NEGATIVE: 0
NEUROLOGICAL NEGATIVE: 0
ALLERGIC/IMMUNOLOGIC NEGATIVE: 0
CONSTITUTIONAL NEGATIVE: 0
GASTROINTESTINAL NEGATIVE: 0

## 2024-09-13 ASSESSMENT — CONF VISUAL FIELD
OD_SUPERIOR_TEMPORAL_RESTRICTION: 0
OD_INFERIOR_NASAL_RESTRICTION: 0
OS_NORMAL: 1
OS_SUPERIOR_NASAL_RESTRICTION: 0
METHOD: COUNTING FINGERS
OD_NORMAL: 1
OS_SUPERIOR_TEMPORAL_RESTRICTION: 0
OD_SUPERIOR_NASAL_RESTRICTION: 0
OS_INFERIOR_NASAL_RESTRICTION: 0
OS_INFERIOR_TEMPORAL_RESTRICTION: 0
OD_INFERIOR_TEMPORAL_RESTRICTION: 0

## 2024-09-13 ASSESSMENT — VISUAL ACUITY
OD_CC+: -2
OS_CC: 20/25
OS_CC: 20/32-1
OD_CC: 20/32-1
METHOD: SNELLEN - LINEAR
OD_CC: 20/20
OS_CC+: +2

## 2024-09-13 ASSESSMENT — SLIT LAMP EXAM - LIDS
COMMENTS: 1+ PTOSIS
COMMENTS: NORMAL

## 2024-09-13 ASSESSMENT — CUP TO DISC RATIO
OS_RATIO: .3
OD_RATIO: .3

## 2024-09-13 ASSESSMENT — EXTERNAL EXAM - LEFT EYE: OS_EXAM: NORMAL

## 2024-09-14 PROBLEM — H35.89 RPE MOTTLING OF MACULA: Status: ACTIVE | Noted: 2024-09-14

## 2024-09-14 PROBLEM — H35.3131 EARLY DRY STAGE NONEXUDATIVE AGE-RELATED MACULAR DEGENERATION OF BOTH EYES: Status: RESOLVED | Noted: 2023-03-14 | Resolved: 2024-09-14

## 2024-09-14 NOTE — PROGRESS NOTES
Assessment/Plan   Diagnoses and all orders for this visit:  Bilateral myopia  Regular astigmatism of both eyes with presbyopia  -New spec rx released today per patient request. Monitor 1 year or sooner prn. Refraction billed today.  Posterior subcapsular age-related cataract, both eyes  Cataract, nuclear sclerotic, both eyes  -non visually significant. Pt ed. Monitor   Type 2 diabetes mellitus without retinopathy (Multi)  -no retinopathy observed on exam today od/os, pt ed to continue good BGlc, blood pressure and lipid control, rtc with any changes in vision, otherwise monitor 1 year  RPE mottling of macula  -patient was evaluated with retina 10/04/21  -DDX of focal outer retinal/RPE atrophy due to potential histo spots vs old   -on DFE today eye continued to appear quiet without active disease  -stable appearance    RTC 1 year for annual with DFE, MRX MAC OCT

## 2024-09-30 ENCOUNTER — OFFICE VISIT (OUTPATIENT)
Dept: CARDIOLOGY | Facility: CLINIC | Age: 74
End: 2024-09-30
Payer: MEDICARE

## 2024-09-30 VITALS
SYSTOLIC BLOOD PRESSURE: 158 MMHG | OXYGEN SATURATION: 100 % | HEIGHT: 68 IN | BODY MASS INDEX: 27.66 KG/M2 | WEIGHT: 182.5 LBS | DIASTOLIC BLOOD PRESSURE: 84 MMHG | HEART RATE: 77 BPM

## 2024-09-30 DIAGNOSIS — E78.00 PURE HYPERCHOLESTEROLEMIA: Primary | ICD-10-CM

## 2024-09-30 DIAGNOSIS — I10 PRIMARY HYPERTENSION: ICD-10-CM

## 2024-09-30 DIAGNOSIS — I25.10 CORONARY ARTERY DISEASE INVOLVING NATIVE CORONARY ARTERY OF NATIVE HEART WITHOUT ANGINA PECTORIS: ICD-10-CM

## 2024-09-30 DIAGNOSIS — I73.9 PERIPHERAL ARTERY DISEASE (CMS-HCC): ICD-10-CM

## 2024-09-30 PROCEDURE — 1123F ACP DISCUSS/DSCN MKR DOCD: CPT | Performed by: NURSE PRACTITIONER

## 2024-09-30 PROCEDURE — 99214 OFFICE O/P EST MOD 30 MIN: CPT | Performed by: NURSE PRACTITIONER

## 2024-09-30 PROCEDURE — 3079F DIAST BP 80-89 MM HG: CPT | Performed by: NURSE PRACTITIONER

## 2024-09-30 PROCEDURE — 1160F RVW MEDS BY RX/DR IN RCRD: CPT | Performed by: NURSE PRACTITIONER

## 2024-09-30 PROCEDURE — 1159F MED LIST DOCD IN RCRD: CPT | Performed by: NURSE PRACTITIONER

## 2024-09-30 PROCEDURE — 1126F AMNT PAIN NOTED NONE PRSNT: CPT | Performed by: NURSE PRACTITIONER

## 2024-09-30 PROCEDURE — 3077F SYST BP >= 140 MM HG: CPT | Performed by: NURSE PRACTITIONER

## 2024-09-30 ASSESSMENT — ENCOUNTER SYMPTOMS
RESPIRATORY NEGATIVE: 1
CONSTITUTIONAL NEGATIVE: 1
CARDIOVASCULAR NEGATIVE: 1

## 2024-09-30 ASSESSMENT — PAIN SCALES - GENERAL: PAINLEVEL: 0-NO PAIN

## 2024-09-30 NOTE — PATIENT INSTRUCTIONS
RETURN IN 6 MONTHS AND CHECK CHOLESTEROL BLOOD TEST BEFORE THAT VISIT    CONTINUE ALL MEDICATION    CALL FOR ANY CONCERNS

## 2024-09-30 NOTE — PROGRESS NOTES
Subjective   Carlos Vallejo is a 74 y.o. male.    Chief Complaint:  No chief complaint on file.    HPI  Mr. Vallejo is seen for a followup appointment.  He is seen in collaboration with Dr. Harper.  He notes a long hospitalization for foot surgery.  He had osteomyelitis with right 4 th toe amputation.  He was treated with   antibiotics for several weeks and in a rehab facility for a short time. He is doing well now and walking with cane. He has no pain.  He was able to lose a few pounds.  He has had no complaints of chest pain or shortness of breath while increasing his activities.  He also tells me that both his wife and son have passed away since April. He does have siblings and friends for support but is lonely.  He has no cardiac concerns today.  He does not need any refills of medication.     Review of Systems   Constitutional: Negative.   Cardiovascular: Negative.    Respiratory: Negative.     All other systems reviewed and are negative.      Objective   Vitals reviewed.   Constitutional:       Appearance: Healthy appearance. Not in distress.   Neck:      Vascular: No JVR. JVD normal.   Pulmonary:      Effort: Pulmonary effort is normal.      Breath sounds: Normal breath sounds. No wheezing. No rhonchi. No rales.   Chest:      Chest wall: Not tender to palpatation.   Cardiovascular:      Normal rate. Regular rhythm. Normal S1. Normal S2.       Murmurs: There is no murmur.      No gallop.  No click. No rub.   Edema:     Peripheral edema present.     Feet: trace edema of the right foot.  Abdominal:      General: Bowel sounds are normal.      Palpations: Abdomen is soft.      Tenderness: There is no abdominal tenderness.   Musculoskeletal: Normal range of motion.         General: No tenderness. Skin:     General: Skin is warm and dry.   Neurological:      General: No focal deficit present.      Mental Status: Alert and oriented to person, place and time.       Lab Review:   Lab Results   Component Value Date      09/09/2024    K 5.0 09/09/2024     09/09/2024    CO2 27 09/09/2024    BUN 25 (H) 09/09/2024    CREATININE 1.72 (H) 09/09/2024    GLUCOSE 195 (H) 09/09/2024    CALCIUM 9.9 09/09/2024     Lab Results   Component Value Date    WBC 5.1 09/09/2024    HGB 12.7 (L) 09/09/2024    HCT 39.9 (L) 09/09/2024    MCV 96 09/09/2024     09/09/2024     Lab Results   Component Value Date    CHOL 136 05/07/2024    TRIG 79 05/07/2024    HDL 33.8 05/07/2024       Assessment/Plan   Mr. Vallejo is a very pleasant 74 year old  male with a past medical history significant for hypertension, hyperlipidemia, T2DM, colon cancer and an elevated CACS of 752.51. Cardiac cath 10/2021 showed single vessel and branch coronary artery disease with normal LV systolic function. Echocardiogram 8/2021 showed an EF of 50-55% with aortic valve sclerosis. He has a true aspirin allergy with anaphylaxis. He was previously on plavix, though this was stopped quite some time ago due to rectal bleeding possibly related to his colon cancer. He presents stable from a cardiac standpoint. His BP is elevated, though he has been under recent stress with the death of his son. He is modestly active, using a cane and denies any symptoms referable to angina. We have previously discussed aspirin desensitization, he remains not interested in this due to occasionally missing doses of his other medications, and fears missing a dose of aspirin and having a reaction once restarted. We also briefly discussed a PCSK9 inhibitor for his cholesterol, though he does not want to add another medication at this time. He knows to call for any concerns. He will return in 6 months and obtain a FLP prior to that visit.

## 2024-10-05 DIAGNOSIS — E11.22 TYPE 2 DIABETES MELLITUS WITH STAGE 3B CHRONIC KIDNEY DISEASE, WITHOUT LONG-TERM CURRENT USE OF INSULIN (MULTI): ICD-10-CM

## 2024-10-05 DIAGNOSIS — N18.32 TYPE 2 DIABETES MELLITUS WITH STAGE 3B CHRONIC KIDNEY DISEASE, WITHOUT LONG-TERM CURRENT USE OF INSULIN (MULTI): ICD-10-CM

## 2024-10-05 RX ORDER — GLIPIZIDE 2.5 MG/1
1 TABLET ORAL
Qty: 90 TABLET | Refills: 0 | Status: SHIPPED | OUTPATIENT
Start: 2024-10-05

## 2024-10-17 ENCOUNTER — PATIENT OUTREACH (OUTPATIENT)
Dept: PRIMARY CARE | Facility: CLINIC | Age: 74
End: 2024-10-17
Payer: MEDICARE

## 2024-10-17 NOTE — PROGRESS NOTES
Call placed regarding 90 day post discharge follow up call.  At time of outreach call the patient feels as if their condition has improved since last outreach.  Questions or concerns regarding recovery period addressed at this time.    Patient states he is doing okay, that his wound is healing, he sees podiatrist tomorrow after one month.

## 2024-10-28 ENCOUNTER — APPOINTMENT (OUTPATIENT)
Dept: CARDIOLOGY | Facility: CLINIC | Age: 74
End: 2024-10-28
Payer: MEDICARE

## 2024-10-30 ENCOUNTER — APPOINTMENT (OUTPATIENT)
Dept: CARDIOLOGY | Facility: HOSPITAL | Age: 74
End: 2024-10-30
Payer: MEDICARE

## 2024-10-30 VITALS
WEIGHT: 186 LBS | OXYGEN SATURATION: 99 % | HEIGHT: 68 IN | HEART RATE: 76 BPM | SYSTOLIC BLOOD PRESSURE: 166 MMHG | BODY MASS INDEX: 28.19 KG/M2 | DIASTOLIC BLOOD PRESSURE: 88 MMHG

## 2024-10-30 DIAGNOSIS — I51.7 MILD CONCENTRIC LEFT VENTRICULAR HYPERTROPHY (LVH): ICD-10-CM

## 2024-10-30 DIAGNOSIS — E78.01 FAMILIAL HYPERCHOLESTEROLEMIA: ICD-10-CM

## 2024-10-30 DIAGNOSIS — I25.10 CORONARY ARTERY DISEASE INVOLVING NATIVE CORONARY ARTERY OF NATIVE HEART WITHOUT ANGINA PECTORIS: Primary | ICD-10-CM

## 2024-10-30 DIAGNOSIS — I10 HYPERTENSION, UNSPECIFIED TYPE: ICD-10-CM

## 2024-10-30 DIAGNOSIS — R93.1 ELEVATED CORONARY ARTERY CALCIUM SCORE: ICD-10-CM

## 2024-10-30 DIAGNOSIS — I10 PRIMARY HYPERTENSION: ICD-10-CM

## 2024-10-30 PROCEDURE — 3079F DIAST BP 80-89 MM HG: CPT | Performed by: NURSE PRACTITIONER

## 2024-10-30 PROCEDURE — 1123F ACP DISCUSS/DSCN MKR DOCD: CPT | Performed by: NURSE PRACTITIONER

## 2024-10-30 PROCEDURE — 1159F MED LIST DOCD IN RCRD: CPT | Performed by: NURSE PRACTITIONER

## 2024-10-30 PROCEDURE — 99213 OFFICE O/P EST LOW 20 MIN: CPT | Performed by: NURSE PRACTITIONER

## 2024-10-30 PROCEDURE — 1036F TOBACCO NON-USER: CPT | Performed by: NURSE PRACTITIONER

## 2024-10-30 PROCEDURE — 3077F SYST BP >= 140 MM HG: CPT | Performed by: NURSE PRACTITIONER

## 2024-10-30 PROCEDURE — 1160F RVW MEDS BY RX/DR IN RCRD: CPT | Performed by: NURSE PRACTITIONER

## 2024-10-30 RX ORDER — ACETAMINOPHEN 500 MG
TABLET ORAL
Qty: 1 EACH | Refills: 0 | Status: SHIPPED | OUTPATIENT
Start: 2024-10-30

## 2024-10-30 ASSESSMENT — ENCOUNTER SYMPTOMS
RESPIRATORY NEGATIVE: 1
CARDIOVASCULAR NEGATIVE: 1
CONSTITUTIONAL NEGATIVE: 1

## 2024-11-05 ENCOUNTER — APPOINTMENT (OUTPATIENT)
Dept: PRIMARY CARE | Facility: CLINIC | Age: 74
End: 2024-11-05
Payer: MEDICARE

## 2024-11-05 VITALS
DIASTOLIC BLOOD PRESSURE: 78 MMHG | BODY MASS INDEX: 28.19 KG/M2 | WEIGHT: 186 LBS | OXYGEN SATURATION: 98 % | SYSTOLIC BLOOD PRESSURE: 164 MMHG | RESPIRATION RATE: 16 BRPM | HEIGHT: 68 IN | HEART RATE: 76 BPM

## 2024-11-05 DIAGNOSIS — I10 PRIMARY HYPERTENSION: ICD-10-CM

## 2024-11-05 DIAGNOSIS — E11.22 TYPE 2 DIABETES MELLITUS WITH STAGE 3B CHRONIC KIDNEY DISEASE, WITHOUT LONG-TERM CURRENT USE OF INSULIN (MULTI): Primary | ICD-10-CM

## 2024-11-05 DIAGNOSIS — N18.32 TYPE 2 DIABETES MELLITUS WITH STAGE 3B CHRONIC KIDNEY DISEASE, WITHOUT LONG-TERM CURRENT USE OF INSULIN (MULTI): Primary | ICD-10-CM

## 2024-11-05 PROCEDURE — 1159F MED LIST DOCD IN RCRD: CPT | Performed by: FAMILY MEDICINE

## 2024-11-05 PROCEDURE — 1123F ACP DISCUSS/DSCN MKR DOCD: CPT | Performed by: FAMILY MEDICINE

## 2024-11-05 PROCEDURE — G2211 COMPLEX E/M VISIT ADD ON: HCPCS | Performed by: FAMILY MEDICINE

## 2024-11-05 PROCEDURE — 1036F TOBACCO NON-USER: CPT | Performed by: FAMILY MEDICINE

## 2024-11-05 PROCEDURE — 3077F SYST BP >= 140 MM HG: CPT | Performed by: FAMILY MEDICINE

## 2024-11-05 PROCEDURE — 3078F DIAST BP <80 MM HG: CPT | Performed by: FAMILY MEDICINE

## 2024-11-05 PROCEDURE — 1160F RVW MEDS BY RX/DR IN RCRD: CPT | Performed by: FAMILY MEDICINE

## 2024-11-05 PROCEDURE — 99214 OFFICE O/P EST MOD 30 MIN: CPT | Performed by: FAMILY MEDICINE

## 2024-11-05 ASSESSMENT — ENCOUNTER SYMPTOMS
OCCASIONAL FEELINGS OF UNSTEADINESS: 0
LOSS OF SENSATION IN FEET: 0
DEPRESSION: 0

## 2024-11-05 NOTE — PROGRESS NOTES
"Subjective   Patient ID: Carlos Vallejo is a 74 y.o. male who presents for Diabetes.    HPI  Has DM w/CKD.  Taking med(s) as directed.  Does not need refills at this time (has enough meds to last for 2 more months).  States podiatry wanted his blood sugars checked so they could order his DM shoes (last HbA1c obtained < 2 months ago).  States he needs order for DM supplies (order was signed and faxed 2 months ago but he has not received them through mail order yet).    Has HTN.    Tx by cardiology.  Ran out of [employer].  Should have refills available at the pharmacy.  Stopped Clonidine on his own (did not notify cardiology) because he's \"taking so much meds.\"  States he's going to look for a supplement from Dr. Latrell Puentes that helps with the arteries and the prostate and see if that helps his BP.  Taking Losartan and Metoprolol as directed.    Review of Systems  No other complaints.     Objective   /78   Pulse 76   Resp 16   Ht 1.727 m (5' 8\")   Wt 84.4 kg (186 lb)   SpO2 98%   BMI 28.28 kg/m²     Physical Exam  Constitutional:       General: He is not in acute distress.     Appearance: He is overweight.   Cardiovascular:      Rate and Rhythm: Normal rate and regular rhythm.      Heart sounds: Normal heart sounds. No murmur heard.     No friction rub. No gallop.   Pulmonary:      Effort: Pulmonary effort is normal.      Breath sounds: Normal breath sounds. No wheezing, rhonchi or rales.   Musculoskeletal:      Comments: Ambulates w/cane   Neurological:      Mental Status: He is oriented to person, place, and time.   Psychiatric:         Mood and Affect: Mood normal.         Behavior: Behavior normal.     Assessment/Plan   Diagnoses and all orders for this visit:  Type 2 diabetes mellitus with stage 3b chronic kidney disease, without long-term current use of insulin (Multi)        -     Improving based on labs.  Continue established Tx plan.  F/U plans documented in the encounter note.   Primary " hypertension    Labs not indicated at this time.  Contact information provided for the medical supply company (patient to call and find out why he has not received his diabetes testing supplies).  Amlodipine (provided by cardiology) should be available at the pharmacy.  Take blood pressure medications as directed by cardiology.  Contact cardiology before stopping any blood pressure medication on your own.  Follow up with cardiology regarding your blood pressure.    F/U 1-2 months: Labs, Refill of diabetes medications.

## 2024-11-05 NOTE — PATIENT INSTRUCTIONS
Labs not indicated at this time.  Contact information provided for the medical supply company (patient to call and find out why he has not received his diabetes testing supplies).  Amlodipine (provided by cardiology) should be available at the pharmacy.  Take blood pressure medications as directed by cardiology.  Contact cardiology before stopping any blood pressure medication on your own.  Follow up with cardiology regarding your blood pressure.    F/U 1-2 months: Labs, Refill of diabetes medications.

## 2024-11-07 ENCOUNTER — APPOINTMENT (OUTPATIENT)
Dept: PRIMARY CARE | Facility: CLINIC | Age: 74
End: 2024-11-07
Payer: MEDICARE

## 2025-01-15 ENCOUNTER — OFFICE VISIT (OUTPATIENT)
Dept: PRIMARY CARE | Facility: CLINIC | Age: 75
End: 2025-01-15
Payer: MEDICARE

## 2025-01-15 VITALS
HEIGHT: 68 IN | BODY MASS INDEX: 29.7 KG/M2 | WEIGHT: 196 LBS | DIASTOLIC BLOOD PRESSURE: 77 MMHG | OXYGEN SATURATION: 98 % | SYSTOLIC BLOOD PRESSURE: 136 MMHG | HEART RATE: 82 BPM | RESPIRATION RATE: 14 BRPM

## 2025-01-15 DIAGNOSIS — E11.22 TYPE 2 DIABETES MELLITUS WITH STAGE 3B CHRONIC KIDNEY DISEASE, WITHOUT LONG-TERM CURRENT USE OF INSULIN (MULTI): ICD-10-CM

## 2025-01-15 DIAGNOSIS — N18.32 TYPE 2 DIABETES MELLITUS WITH STAGE 3B CHRONIC KIDNEY DISEASE, WITHOUT LONG-TERM CURRENT USE OF INSULIN (MULTI): ICD-10-CM

## 2025-01-15 PROCEDURE — G2211 COMPLEX E/M VISIT ADD ON: HCPCS | Performed by: FAMILY MEDICINE

## 2025-01-15 PROCEDURE — 1123F ACP DISCUSS/DSCN MKR DOCD: CPT | Performed by: FAMILY MEDICINE

## 2025-01-15 PROCEDURE — 1159F MED LIST DOCD IN RCRD: CPT | Performed by: FAMILY MEDICINE

## 2025-01-15 PROCEDURE — 1160F RVW MEDS BY RX/DR IN RCRD: CPT | Performed by: FAMILY MEDICINE

## 2025-01-15 PROCEDURE — 99214 OFFICE O/P EST MOD 30 MIN: CPT | Performed by: FAMILY MEDICINE

## 2025-01-15 PROCEDURE — 3075F SYST BP GE 130 - 139MM HG: CPT | Performed by: FAMILY MEDICINE

## 2025-01-15 PROCEDURE — 3078F DIAST BP <80 MM HG: CPT | Performed by: FAMILY MEDICINE

## 2025-01-15 RX ORDER — INSULIN PUMP SYRINGE, 3 ML
EACH MISCELLANEOUS
Qty: 1 EACH | Refills: 0 | Status: SHIPPED | OUTPATIENT
Start: 2025-01-15 | End: 2026-01-15

## 2025-01-15 RX ORDER — BLOOD SUGAR DIAGNOSTIC
1 STRIP MISCELLANEOUS DAILY
Qty: 100 EACH | Refills: 3 | Status: SHIPPED | OUTPATIENT
Start: 2025-01-15

## 2025-01-15 NOTE — PATIENT INSTRUCTIONS
Nonfasting labs.  Order printed for diabetes supplies.  Will refill diabetes meds after reviewing lab results.  Patient to check with pharmacy to see if Farxiga more affordable than Jardiance.  Follow up with specialists a brittany.    F/U 6 months: Annual wellness visit.    Lab services: [address]  Hours: M-F 6:30a-6p, Sat 8a-12p  Phone: [phone], [address]

## 2025-01-15 NOTE — PROGRESS NOTES
"Subjective   Patient ID: Carlos Vallejo is a 74 y.o. male who presents for Med Refill.    HPI  Has DM, CKD.  Has not been taking Jardiance due to cost.  Taking other med(s) as directed.  Requests refills.    Still did not get DM supplies (glucometer, test strips).  M.A. faxed the paperwork twice, patient faxed the paperwork himself once.  Will print Rx so he can consider an alternate place to get them.  Not currently on Insulin.  Will test daily.  Dx: E11.22, N18.31.    Review of Systems  No other complaints.     Objective   /77   Pulse 82   Resp 14   Ht 1.727 m (5' 8\")   Wt 88.9 kg (196 lb)   SpO2 98%   BMI 29.80 kg/m²     Physical Exam  Constitutional:       General: He is not in acute distress.     Appearance: He is overweight.   Cardiovascular:      Rate and Rhythm: Normal rate and regular rhythm.      Heart sounds: Normal heart sounds. No murmur heard.     No friction rub. No gallop.   Pulmonary:      Effort: Pulmonary effort is normal.      Breath sounds: Normal breath sounds. No wheezing, rhonchi or rales.   Neurological:      Mental Status: He is oriented to person, place, and time.   Psychiatric:         Mood and Affect: Mood normal.         Behavior: Behavior normal.     Assessment/Plan   Diagnoses and all orders for this visit:  Type 2 diabetes mellitus with stage 3b chronic kidney disease, without long-term current use of insulin (Multi)  -     Labs ordered to assess stability.  Tx plan will be updated after reviewing lab results.  F/U plans documented in the encounter note.   -     Basic Metabolic Panel; Future  -     Hemoglobin A1C; Future  -     Blood glucose monitoring meter; Use as directed  -     blood sugar diagnostic (FreeStyle Test) strip; 1 strip once daily.    Nonfasting labs.  Order printed for diabetes supplies.  Will refill diabetes meds after reviewing lab results.  Patient to check with pharmacy to see if Farxiga more affordable than Jardiance (may consider pharmacy referral to " check patient assistance programs).  Follow up with specialists a directed.    F/U 6 months: Annual wellness visit.

## 2025-02-12 ENCOUNTER — OFFICE VISIT (OUTPATIENT)
Dept: CARDIOLOGY | Facility: HOSPITAL | Age: 75
End: 2025-02-12
Payer: MEDICARE

## 2025-02-12 VITALS
SYSTOLIC BLOOD PRESSURE: 146 MMHG | HEART RATE: 82 BPM | BODY MASS INDEX: 28.79 KG/M2 | OXYGEN SATURATION: 98 % | HEIGHT: 68 IN | WEIGHT: 190 LBS | DIASTOLIC BLOOD PRESSURE: 82 MMHG

## 2025-02-12 DIAGNOSIS — I10 HYPERTENSION, UNSPECIFIED TYPE: ICD-10-CM

## 2025-02-12 DIAGNOSIS — I25.10 CORONARY ARTERY DISEASE INVOLVING NATIVE CORONARY ARTERY OF NATIVE HEART WITHOUT ANGINA PECTORIS: Primary | ICD-10-CM

## 2025-02-12 DIAGNOSIS — I10 PRIMARY HYPERTENSION: ICD-10-CM

## 2025-02-12 DIAGNOSIS — E78.5 HYPERLIPIDEMIA, UNSPECIFIED: ICD-10-CM

## 2025-02-12 DIAGNOSIS — E78.00 PURE HYPERCHOLESTEROLEMIA: ICD-10-CM

## 2025-02-12 LAB
ATRIAL RATE: 82 BPM
P AXIS: 6 DEGREES
P OFFSET: 198 MS
P ONSET: 134 MS
PR INTERVAL: 170 MS
Q ONSET: 219 MS
QRS COUNT: 13 BEATS
QRS DURATION: 150 MS
QT INTERVAL: 402 MS
QTC CALCULATION(BAZETT): 469 MS
QTC FREDERICIA: 446 MS
R AXIS: -41 DEGREES
T AXIS: 29 DEGREES
T OFFSET: 420 MS
VENTRICULAR RATE: 82 BPM

## 2025-02-12 PROCEDURE — 1123F ACP DISCUSS/DSCN MKR DOCD: CPT | Performed by: NURSE PRACTITIONER

## 2025-02-12 PROCEDURE — 99214 OFFICE O/P EST MOD 30 MIN: CPT | Performed by: NURSE PRACTITIONER

## 2025-02-12 PROCEDURE — 93005 ELECTROCARDIOGRAM TRACING: CPT | Performed by: NURSE PRACTITIONER

## 2025-02-12 PROCEDURE — 93010 ELECTROCARDIOGRAM REPORT: CPT | Performed by: INTERNAL MEDICINE

## 2025-02-12 PROCEDURE — 3077F SYST BP >= 140 MM HG: CPT | Performed by: NURSE PRACTITIONER

## 2025-02-12 PROCEDURE — 3079F DIAST BP 80-89 MM HG: CPT | Performed by: NURSE PRACTITIONER

## 2025-02-12 PROCEDURE — 1160F RVW MEDS BY RX/DR IN RCRD: CPT | Performed by: NURSE PRACTITIONER

## 2025-02-12 PROCEDURE — 1159F MED LIST DOCD IN RCRD: CPT | Performed by: NURSE PRACTITIONER

## 2025-02-12 RX ORDER — METOPROLOL SUCCINATE 25 MG/1
25 TABLET, EXTENDED RELEASE ORAL DAILY
Qty: 90 TABLET | Refills: 3 | Status: SHIPPED | OUTPATIENT
Start: 2025-02-12 | End: 2026-02-12

## 2025-02-12 ASSESSMENT — ENCOUNTER SYMPTOMS
CARDIOVASCULAR NEGATIVE: 1
RESPIRATORY NEGATIVE: 1
CONSTITUTIONAL NEGATIVE: 1

## 2025-02-12 NOTE — PROGRESS NOTES
Subjective   Carlos Vallejo is a 74 y.o. male.    Chief Complaint:  No chief complaint on file.    HPI  Mr. Vallejo is seen for a 3-month appointment.  He is seen in collaboration with Dr. Harper.  He tells me he has been trying to obtain glucose monitoring equipment but has been unsuccessful.  He also tells me that he is not taking clonidine or metoprolol.  He states that his PCP is aware that he is not taking clonidine.  He is not certain what his home blood pressures have been but thinks they have been okay.  He denies any cardiovascular concerns specifically chest pain or shortness of breath.  He is not particularly active but denies any symptoms referable to angina with his current activity level.    Review of Systems   Constitutional: Negative.   Cardiovascular: Negative.    Respiratory: Negative.     All other systems reviewed and are negative.      Objective   Vitals reviewed.   Constitutional:       Appearance: Healthy appearance. Not in distress.   Neck:      Vascular: No JVR. JVD normal.   Pulmonary:      Effort: Pulmonary effort is normal.      Breath sounds: Normal breath sounds. No wheezing. No rhonchi. No rales.   Chest:      Chest wall: Not tender to palpatation.   Cardiovascular:      Normal rate. Regular rhythm. Normal S1. Normal S2.       Murmurs: There is no murmur.      No gallop.  No click. No rub.   Edema:     Peripheral edema absent.   Abdominal:      Tenderness: There is no abdominal tenderness.   Musculoskeletal: Normal range of motion.         General: No tenderness. Skin:     General: Skin is warm and dry.   Neurological:      General: No focal deficit present.      Mental Status: Alert and oriented to person, place and time.         Lab Review:   Lab Results   Component Value Date     09/09/2024    K 5.0 09/09/2024     09/09/2024    CO2 27 09/09/2024    BUN 25 (H) 09/09/2024    CREATININE 1.72 (H) 09/09/2024    GLUCOSE 195 (H) 09/09/2024    CALCIUM 9.9 09/09/2024     Lab  Results   Component Value Date    WBC 5.1 09/09/2024    HGB 12.7 (L) 09/09/2024    HCT 39.9 (L) 09/09/2024    MCV 96 09/09/2024     09/09/2024     Lab Results   Component Value Date    CHOL 136 05/07/2024    TRIG 79 05/07/2024    HDL 33.8 05/07/2024     ECG obtained and reviewed, shows sinus rhythm with a right bundle branch block and ventricular rate of 82 bpm    Assessment/Plan   Mr. Vallejo is a very pleasant 74 year old  male with a past medical history significant for hypertension, hyperlipidemia, T2DM, colon cancer and an elevated CACS of 752.51. Cardiac cath 10/2021 showed single vessel and branch coronary artery disease with normal LV systolic function. Echocardiogram 8/2021 showed an EF of 50-55% with aortic valve sclerosis. He has a true aspirin allergy with anaphylaxis. He does not want to try desensitization.  He was previously on Plavix, though this was stopped quite some time ago due to rectal bleeding possibly related to his colon cancer. He presents stable from a cardiac standpoint however indicates that he is not taking clonidine or metoprolol.  Blood pressure slightly elevated today.  We discussed the importance of compliance.  He does not want to resume clonidine.  He is looking into a substitute for Jardiance.  I would like him to resume metoprolol XL 25 mg daily.  His other medications will remain unchanged.  I did asked that he try to obtain a blood pressure at least twice weekly and a home blood pressure monitor if possible.  He will return for follow-up in 3 months and obtain a FLP in the next day or two.  He knows to call with any interim concerns.

## 2025-02-12 NOTE — PATIENT INSTRUCTIONS
RESUME METOPROLOL 25 MG XL DAILY    CHECK BLOOD PRESSURE AT HOME    TALK WITH DR. BOBO ABOUT YOUR DIABETES MEDICATION INCLUDING A SUB FOR JARDIANCE    OBTAIN FASTING LAB WORK    RETURN IN 3 MONTHS    CALL FOR ANY CONCERNS

## 2025-02-13 LAB
ALT SERPL-CCNC: 17 U/L (ref 9–46)
AST SERPL-CCNC: 14 U/L (ref 10–35)
CHOLEST SERPL-MCNC: 136 MG/DL
CHOLEST/HDLC SERPL: 3.4 (CALC)
HDLC SERPL-MCNC: 40 MG/DL
LDLC SERPL CALC-MCNC: 80 MG/DL (CALC)
NONHDLC SERPL-MCNC: 96 MG/DL (CALC)
TRIGL SERPL-MCNC: 84 MG/DL

## 2025-03-16 DIAGNOSIS — N18.31 TYPE 2 DIABETES MELLITUS WITH STAGE 3A CHRONIC KIDNEY DISEASE, WITHOUT LONG-TERM CURRENT USE OF INSULIN (MULTI): ICD-10-CM

## 2025-03-16 DIAGNOSIS — E11.22 TYPE 2 DIABETES MELLITUS WITH STAGE 3A CHRONIC KIDNEY DISEASE, WITHOUT LONG-TERM CURRENT USE OF INSULIN (MULTI): ICD-10-CM

## 2025-04-11 RX ORDER — EMPAGLIFLOZIN 25 MG/1
25 TABLET, FILM COATED ORAL DAILY
Qty: 90 TABLET | Refills: 0 | OUTPATIENT
Start: 2025-04-11

## 2025-04-16 ENCOUNTER — OFFICE VISIT (OUTPATIENT)
Dept: CARDIOLOGY | Facility: HOSPITAL | Age: 75
End: 2025-04-16
Payer: MEDICARE

## 2025-04-16 VITALS
HEIGHT: 68 IN | SYSTOLIC BLOOD PRESSURE: 142 MMHG | BODY MASS INDEX: 29.21 KG/M2 | WEIGHT: 192.7 LBS | OXYGEN SATURATION: 97 % | DIASTOLIC BLOOD PRESSURE: 72 MMHG | HEART RATE: 76 BPM

## 2025-04-16 DIAGNOSIS — I10 PRIMARY HYPERTENSION: Primary | ICD-10-CM

## 2025-04-16 DIAGNOSIS — I25.10 CORONARY ARTERY DISEASE INVOLVING NATIVE CORONARY ARTERY OF NATIVE HEART WITHOUT ANGINA PECTORIS: ICD-10-CM

## 2025-04-16 DIAGNOSIS — E78.5 HYPERLIPIDEMIA, UNSPECIFIED HYPERLIPIDEMIA TYPE: ICD-10-CM

## 2025-04-16 DIAGNOSIS — F17.219 CIGARETTE NICOTINE DEPENDENCE WITH NICOTINE-INDUCED DISORDER: Primary | ICD-10-CM

## 2025-04-16 PROCEDURE — 3078F DIAST BP <80 MM HG: CPT | Performed by: NURSE PRACTITIONER

## 2025-04-16 PROCEDURE — 1123F ACP DISCUSS/DSCN MKR DOCD: CPT | Performed by: NURSE PRACTITIONER

## 2025-04-16 PROCEDURE — 99214 OFFICE O/P EST MOD 30 MIN: CPT | Performed by: NURSE PRACTITIONER

## 2025-04-16 PROCEDURE — 3077F SYST BP >= 140 MM HG: CPT | Performed by: NURSE PRACTITIONER

## 2025-04-16 PROCEDURE — 1036F TOBACCO NON-USER: CPT | Performed by: NURSE PRACTITIONER

## 2025-04-16 PROCEDURE — 1160F RVW MEDS BY RX/DR IN RCRD: CPT | Performed by: NURSE PRACTITIONER

## 2025-04-16 PROCEDURE — 1159F MED LIST DOCD IN RCRD: CPT | Performed by: NURSE PRACTITIONER

## 2025-04-16 ASSESSMENT — ENCOUNTER SYMPTOMS
CONSTITUTIONAL NEGATIVE: 1
CARDIOVASCULAR NEGATIVE: 1
RESPIRATORY NEGATIVE: 1

## 2025-04-16 NOTE — PROGRESS NOTES
Subjective   Carlos Vallejo is a 74 y.o. male.    Chief Complaint:  No chief complaint on file.    HPI  Mr. Vallejo is seen for a 6 month followup in collaboration with Dr. Harper.  He denies recent hospitalizations or ED visits.  He denies any significant changes to his health.  He is complaint with medication and tolerated recently added Metoprolol.  He has no particular cardiac concerns.  He continues to be moderately active and able to perform all ADLs without symptoms referable to angina.  He has had a cold the past few days other wise has felt very well.     Review of Systems   Constitutional: Negative.   Cardiovascular: Negative.    Respiratory: Negative.     All other systems reviewed and are negative.      Objective   Vitals reviewed.   Constitutional:       Appearance: Healthy appearance. Not in distress.   Neck:      Vascular: No JVR. JVD normal.   Pulmonary:      Effort: Pulmonary effort is normal.      Breath sounds: Normal breath sounds. No wheezing. No rhonchi. No rales.   Chest:      Chest wall: Not tender to palpatation.   Cardiovascular:      Normal rate. Regular rhythm. Normal S1. Normal S2.       Murmurs: There is no murmur.      No gallop.  No click. No rub.   Pulses:     Intact distal pulses.   Edema:     Peripheral edema absent.   Abdominal:      Tenderness: There is no abdominal tenderness.   Musculoskeletal: Normal range of motion.         General: No tenderness. Skin:     General: Skin is warm and dry.   Neurological:      General: No focal deficit present.      Mental Status: Alert and oriented to person, place and time.         Lab Review:   Lab Results   Component Value Date     09/09/2024    K 5.0 09/09/2024     09/09/2024    CO2 27 09/09/2024    BUN 25 (H) 09/09/2024    CREATININE 1.72 (H) 09/09/2024    GLUCOSE 195 (H) 09/09/2024    CALCIUM 9.9 09/09/2024     Lab Results   Component Value Date    WBC 5.1 09/09/2024    HGB 12.7 (L) 09/09/2024    HCT 39.9 (L) 09/09/2024     MCV 96 09/09/2024     09/09/2024     Lab Results   Component Value Date    CHOL 136 02/12/2025    TRIG 84 02/12/2025    HDL 40 02/12/2025       Assessment/Plan   Mr. Vallejo is a very pleasant 74 year old  male with a past medical history significant for hypertension, hyperlipidemia, T2DM, colon cancer and an elevated CACS of 752.51. Cardiac cath 10/2021 showed single vessel and branch coronary artery disease with normal LV systolic function.  He has a true aspirin allergy with anaphylaxis. He does not want to try desensitization. He was previously on Plavix, though this was stopped quite some time ago due to rectal bleeding possibly related to his colon cancer. He presents stable from a cardiac standpoint and denies any symptoms referable to angina.  VS are stable.  BP is slightly elevated although he took his medication just recently.  We reviewed labs which show an LDL cholesterol of 80 mgdL.  We discussed improving diet and exercise.  He does not want to change medication or add injectable therapy at this time.  He is working with his PCP on improving diabetic control.  Therefore, I will continue his medication unchanged.  He does not need any cardiac testing at this time.  He will return in 6 months and will call for any interm concerns.

## 2025-04-17 ENCOUNTER — TELEPHONE (OUTPATIENT)
Dept: RADIOLOGY | Facility: HOSPITAL | Age: 75
End: 2025-04-17
Payer: MEDICARE

## 2025-04-17 NOTE — TELEPHONE ENCOUNTER
Outreach attempt to schedule annual lung cancer screening exam. Unable to leave a message on VM .VM not set up. Patient is not active in Epic.     
normal rate and rhythm, no chest pain and no edema.
surgery

## 2025-04-29 ENCOUNTER — TELEPHONE (OUTPATIENT)
Dept: RADIOLOGY | Facility: HOSPITAL | Age: 75
End: 2025-04-29
Payer: MEDICARE

## 2025-04-29 NOTE — TELEPHONE ENCOUNTER
Outreach call to assist in scheduling annual lung cancer screening exam. VM is not set up. Unable to leave a message. Patient is not active in My Chart.

## 2025-05-06 ENCOUNTER — APPOINTMENT (OUTPATIENT)
Dept: PRIMARY CARE | Facility: CLINIC | Age: 75
End: 2025-05-06
Payer: MEDICARE

## 2025-05-06 VITALS
OXYGEN SATURATION: 95 % | BODY MASS INDEX: 31.5 KG/M2 | SYSTOLIC BLOOD PRESSURE: 134 MMHG | WEIGHT: 196 LBS | DIASTOLIC BLOOD PRESSURE: 72 MMHG | TEMPERATURE: 98 F | HEART RATE: 62 BPM | HEIGHT: 66 IN

## 2025-05-06 DIAGNOSIS — Z12.5 PROSTATE CANCER SCREENING: ICD-10-CM

## 2025-05-06 DIAGNOSIS — D64.9 ANEMIA, UNSPECIFIED TYPE: ICD-10-CM

## 2025-05-06 DIAGNOSIS — E11.22 TYPE 2 DIABETES MELLITUS WITH STAGE 3B CHRONIC KIDNEY DISEASE, WITHOUT LONG-TERM CURRENT USE OF INSULIN (MULTI): ICD-10-CM

## 2025-05-06 DIAGNOSIS — E78.5 HYPERLIPIDEMIA, UNSPECIFIED HYPERLIPIDEMIA TYPE: ICD-10-CM

## 2025-05-06 DIAGNOSIS — D59.39 ATYPICAL HEMOLYTIC UREMIC SYNDROME: ICD-10-CM

## 2025-05-06 DIAGNOSIS — N18.32 TYPE 2 DIABETES MELLITUS WITH STAGE 3B CHRONIC KIDNEY DISEASE, WITHOUT LONG-TERM CURRENT USE OF INSULIN (MULTI): ICD-10-CM

## 2025-05-06 DIAGNOSIS — E66.811 CLASS 1 OBESITY DUE TO EXCESS CALORIES WITH SERIOUS COMORBIDITY AND BODY MASS INDEX (BMI) OF 31.0 TO 31.9 IN ADULT: ICD-10-CM

## 2025-05-06 DIAGNOSIS — E66.09 CLASS 1 OBESITY DUE TO EXCESS CALORIES WITH SERIOUS COMORBIDITY AND BODY MASS INDEX (BMI) OF 31.0 TO 31.9 IN ADULT: ICD-10-CM

## 2025-05-06 DIAGNOSIS — I35.8 AORTIC VALVE SCLEROSIS: ICD-10-CM

## 2025-05-06 DIAGNOSIS — I10 PRIMARY HYPERTENSION: ICD-10-CM

## 2025-05-06 DIAGNOSIS — I73.9 PERIPHERAL ARTERY DISEASE: ICD-10-CM

## 2025-05-06 DIAGNOSIS — Z00.00 MEDICARE ANNUAL WELLNESS VISIT, SUBSEQUENT: Primary | ICD-10-CM

## 2025-05-06 DIAGNOSIS — I25.10 CORONARY ARTERY DISEASE INVOLVING NATIVE CORONARY ARTERY OF NATIVE HEART WITHOUT ANGINA PECTORIS: ICD-10-CM

## 2025-05-06 PROCEDURE — 99214 OFFICE O/P EST MOD 30 MIN: CPT | Performed by: FAMILY MEDICINE

## 2025-05-06 PROCEDURE — 1160F RVW MEDS BY RX/DR IN RCRD: CPT | Performed by: FAMILY MEDICINE

## 2025-05-06 PROCEDURE — G0439 PPPS, SUBSEQ VISIT: HCPCS | Performed by: FAMILY MEDICINE

## 2025-05-06 PROCEDURE — 3075F SYST BP GE 130 - 139MM HG: CPT | Performed by: FAMILY MEDICINE

## 2025-05-06 PROCEDURE — 1170F FXNL STATUS ASSESSED: CPT | Performed by: FAMILY MEDICINE

## 2025-05-06 PROCEDURE — 3078F DIAST BP <80 MM HG: CPT | Performed by: FAMILY MEDICINE

## 2025-05-06 PROCEDURE — 1159F MED LIST DOCD IN RCRD: CPT | Performed by: FAMILY MEDICINE

## 2025-05-06 ASSESSMENT — ANXIETY QUESTIONNAIRES
2. NOT BEING ABLE TO STOP OR CONTROL WORRYING: NOT AT ALL
7. FEELING AFRAID AS IF SOMETHING AWFUL MIGHT HAPPEN: NOT AT ALL
5. BEING SO RESTLESS THAT IT IS HARD TO SIT STILL: NOT AT ALL
GAD7 TOTAL SCORE: 0
3. WORRYING TOO MUCH ABOUT DIFFERENT THINGS: NOT AT ALL
4. TROUBLE RELAXING: NOT AT ALL
1. FEELING NERVOUS, ANXIOUS, OR ON EDGE: NOT AT ALL
6. BECOMING EASILY ANNOYED OR IRRITABLE: NOT AT ALL
IF YOU CHECKED OFF ANY PROBLEMS ON THIS QUESTIONNAIRE, HOW DIFFICULT HAVE THESE PROBLEMS MADE IT FOR YOU TO DO YOUR WORK, TAKE CARE OF THINGS AT HOME, OR GET ALONG WITH OTHER PEOPLE: NOT DIFFICULT AT ALL

## 2025-05-06 ASSESSMENT — PATIENT HEALTH QUESTIONNAIRE - PHQ9
1. LITTLE INTEREST OR PLEASURE IN DOING THINGS: NOT AT ALL
SUM OF ALL RESPONSES TO PHQ9 QUESTIONS 1 AND 2: 0
2. FEELING DOWN, DEPRESSED OR HOPELESS: NOT AT ALL

## 2025-05-06 ASSESSMENT — ACTIVITIES OF DAILY LIVING (ADL)
DOING_HOUSEWORK: INDEPENDENT
MANAGING_FINANCES: INDEPENDENT
GROCERY_SHOPPING: INDEPENDENT
TAKING_MEDICATION: INDEPENDENT
BATHING: INDEPENDENT
DRESSING: INDEPENDENT

## 2025-05-06 ASSESSMENT — ENCOUNTER SYMPTOMS
OCCASIONAL FEELINGS OF UNSTEADINESS: 1
LOSS OF SENSATION IN FEET: 0
DEPRESSION: 0

## 2025-05-06 NOTE — PROGRESS NOTES
Subjective   Reason for Visit: Carlos Vallejo is an 74 y.o. male here for a Medicare Wellness visit.     Past Medical, Surgical, and Family History reviewed and updated in chart.    Reviewed all medications by prescribing practitioner or clinical pharmacist (such as prescriptions, OTCs, herbal therapies and supplements) and documented in the medical record.    HPI    Patient Self Assessment of Health Status  Patient Self Assessment: Good    Nutrition and Exercise  Current Diet: Well Balanced Diet  Adequate Fluid Intake: Yes  Caffeine: Yes  Exercise Frequency: Regularly    Functional Ability/Level of Safety  Cognitive Impairment Observed: No cognitive impairment observed  Cognitive Impairment Reported: No cognitive impairment reported by patient or family    Home Safety Risk Factors: None    Has DM w/CKD.  Taking Jardiance, but has not been taking Glipizide (he is not sure why).  Requests med refills.    Has HLD, HTN.    Tx by cardiology.    Medicare Wellness Billing Compliance Satisfied    *This is a visual tool to show completion of required items on the day of the visit. Green checks will only appear on the date of visit.    Review all medications by prescribing practitioner or clinical pharmacist (such as prescriptions, OTCs, herbal therapies and supplements) documented in the medical record    Past Medical, Surgical, and Family History reviewed and updated in chart    Tobacco Use Reviewed    Alcohol Use Reviewed    Illicit Drug Use Reviewed    PHQ2/9    Falls in Last Year Reviewed    Home Safety Risk Factors Reviewed    Cognitive Impairment Reviewed    Patient Self Assessment and Health Status    Current Diet Reviewed    Exercise Frequency    ADL - Hearing Impairment    ADL - Bathing    ADL - Dressing    ADL - Walks in Home    IADL - Managing Finances    IADL - Grocery Shopping    IADL - Taking Medications    IADL - Doing Housework      Patient Care Team:  Sergei Dowell MD as PCP -  "General  Sergei Dowell MD as PCP - [employer] PCP     Review of Systems  No other complaints.     Objective   Vitals:  /72   Pulse 62   Temp 36.7 °C (98 °F) (Temporal)   Ht 1.676 m (5' 6\")   Wt 88.9 kg (196 lb)   SpO2 95%   BMI 31.64 kg/m²       Physical Exam  Constitutional:       General: He is not in acute distress.     Appearance: He is obese.   Musculoskeletal:      Comments: Ambulating w/o assistance   Neurological:      Mental Status: He is oriented to person, place, and time.   Psychiatric:         Mood and Affect: Mood normal.         Behavior: Behavior normal.     Assessment/Plan   Diagnoses and all orders for this visit:  Medicare annual wellness visit, subsequent  Type 2 diabetes mellitus with stage 3b chronic kidney disease, without long-term current use of insulin (Multi)  -     Stable based on last labs.  Continue established Tx plan.  F/U plans documented in the encounter note.   -     Basic Metabolic Panel; Future  -     Albumin-Creatinine Ratio, Urine Random; Future  -     Hemoglobin A1C; Future  Anemia, unspecified type  -     CBC; Future  Prostate cancer screening  -     Prostate Specific Antigen, Screen; Future  Aortic valve sclerosis        -    Tx by cardiology  Coronary artery disease involving native coronary artery of native heart without angina pectoris        -    Tx by cardiology  Primary hypertension        -    Tx by cardiology  Hyperlipidemia, unspecified hyperlipidemia type        -    Tx by cardiology  Atypical hemolytic uremic syndrome        -    Saw nephrology in the past  Peripheral artery disease (CMS-HCC)        -    Saw vascular in the past.  See specialist's notes to determine stability.  Continue established Tx plan.  F/U w/specialist as directed.  Class 1 obesity due to excess calories with serious comorbidity and body mass index (BMI) of 31.0 to 31.9 in adult    Risk factors identified during visit:   Tobacco: Low dose CT chest previously " ordered for lung cancer screening  BMI<18.5 or >25: Recommend weight loss efforts (see [email or web address]/nutrition for ideas)  BMI<18.5 or >25: Patient declines nutrition referrals    Flu shot: Up to date.  PPSV23: Up to date.  PCV13: Up to date.  PCV20: N/A.  RSV: Recommended (local pharmacy).  Shingrix: Recommended (local pharmacy).  Colon cancer screening: Up to date (2023, repeat in 3 yrs).   AAA screening: Up to date.  HIV screening: N/A.  Hepatitis C screening: Up to date.  Prostate cancer screening: Ordered.    Recommend living will, health care power of .    Nonfasting labs.  Will refill Jardiance after reviewing lab results.  Will restart Glipizide after reviewing lab results if HbA1c not at goal.  Make appointment with ophthalmology and podiatry for annual eye and foot exams.  Follow up with specialists as directed.    F/U 6 months: Med refills.

## 2025-05-06 NOTE — PATIENT INSTRUCTIONS
Risk factors identified during visit:   Tobacco: Low dose CT chest previously ordered for lung cancer screening  BMI<18.5 or >25: Recommend weight loss efforts (see [email or web address]/nutrition for ideas)  BMI<18.5 or >25: Patient declines nutrition referrals    Flu shot: Up to date.  PPSV23: Up to date.  PCV13: Up to date.  PCV20: N/A.  RSV: Recommended (local pharmacy).  Shingrix: Recommended (local pharmacy).  Colon cancer screening: Up to date (2023, repeat in 3 yrs).   AAA screening: Up to date.  HIV screening: N/A.  Hepatitis C screening: Up to date.  Prostate cancer screening: Ordered.    Recommend living will, health care power of .    Nonfasting labs.  Will refill Jardiance after reviewing lab results.  Will restart Glipizide after reviewing lab results if HbA1c not at goal.  Make appointment with ophthalmology and podiatry for annual eye and foot exams.  Follow up with specialists as directed.    F/U 6 months: Med refills.    Lab services: [address]  Hours: M-F 7:15a-6:00p  Phone: [phone], Option 1

## 2025-05-07 LAB
ALBUMIN/CREAT UR: 26 MG/G CREAT
ANION GAP SERPL CALCULATED.4IONS-SCNC: 11 MMOL/L (CALC) (ref 7–17)
BUN SERPL-MCNC: 27 MG/DL (ref 7–25)
BUN/CREAT SERPL: 13 (CALC) (ref 6–22)
CALCIUM SERPL-MCNC: 8.9 MG/DL (ref 8.6–10.3)
CHLORIDE SERPL-SCNC: 109 MMOL/L (ref 98–110)
CO2 SERPL-SCNC: 20 MMOL/L (ref 20–32)
CREAT SERPL-MCNC: 2.11 MG/DL (ref 0.7–1.28)
CREAT UR-MCNC: 78 MG/DL (ref 20–320)
EGFRCR SERPLBLD CKD-EPI 2021: 32 ML/MIN/1.73M2
ERYTHROCYTE [DISTWIDTH] IN BLOOD BY AUTOMATED COUNT: 13.2 % (ref 11–15)
EST. AVERAGE GLUCOSE BLD GHB EST-MCNC: 194 MG/DL
EST. AVERAGE GLUCOSE BLD GHB EST-SCNC: 10.8 MMOL/L
GLUCOSE SERPL-MCNC: 185 MG/DL (ref 65–139)
HBA1C MFR BLD: 8.4 %
HCT VFR BLD AUTO: 40.2 % (ref 38.5–50)
HGB BLD-MCNC: 13 G/DL (ref 13.2–17.1)
MCH RBC QN AUTO: 31 PG (ref 27–33)
MCHC RBC AUTO-ENTMCNC: 32.3 G/DL (ref 32–36)
MCV RBC AUTO: 95.7 FL (ref 80–100)
MICROALBUMIN UR-MCNC: 2 MG/DL
PLATELET # BLD AUTO: 228 THOUSAND/UL (ref 140–400)
PMV BLD REES-ECKER: 10.8 FL (ref 7.5–12.5)
POTASSIUM SERPL-SCNC: 5.1 MMOL/L (ref 3.5–5.3)
PSA SERPL-MCNC: 1.62 NG/ML
RBC # BLD AUTO: 4.2 MILLION/UL (ref 4.2–5.8)
SODIUM SERPL-SCNC: 140 MMOL/L (ref 135–146)
WBC # BLD AUTO: 4.3 THOUSAND/UL (ref 3.8–10.8)

## 2025-05-12 PROBLEM — C18.9 ADENOCARCINOMA OF COLON: Status: RESOLVED | Noted: 2023-03-14 | Resolved: 2025-05-12

## 2025-06-09 DIAGNOSIS — I10 ESSENTIAL (PRIMARY) HYPERTENSION: ICD-10-CM

## 2025-06-09 RX ORDER — AMLODIPINE BESYLATE 10 MG/1
10 TABLET ORAL DAILY
Qty: 90 TABLET | Refills: 3 | Status: SHIPPED | OUTPATIENT
Start: 2025-06-09

## 2025-06-22 DIAGNOSIS — E11.22 TYPE 2 DIABETES MELLITUS WITH STAGE 3B CHRONIC KIDNEY DISEASE, WITHOUT LONG-TERM CURRENT USE OF INSULIN (MULTI): Primary | ICD-10-CM

## 2025-06-22 DIAGNOSIS — N18.32 TYPE 2 DIABETES MELLITUS WITH STAGE 3B CHRONIC KIDNEY DISEASE, WITHOUT LONG-TERM CURRENT USE OF INSULIN (MULTI): Primary | ICD-10-CM

## 2025-06-22 RX ORDER — GLIPIZIDE 2.5 MG/1
2.5 TABLET ORAL
Qty: 100 TABLET | Refills: 0 | Status: SHIPPED | OUTPATIENT
Start: 2025-06-22

## 2025-06-25 DIAGNOSIS — I10 ESSENTIAL (PRIMARY) HYPERTENSION: ICD-10-CM

## 2025-06-25 DIAGNOSIS — E78.5 HYPERLIPIDEMIA, UNSPECIFIED: ICD-10-CM

## 2025-06-25 DIAGNOSIS — E78.5 HYPERLIPIDEMIA, UNSPECIFIED HYPERLIPIDEMIA TYPE: ICD-10-CM

## 2025-06-27 RX ORDER — ATORVASTATIN CALCIUM 40 MG/1
40 TABLET, FILM COATED ORAL DAILY
Qty: 90 TABLET | Refills: 3 | Status: SHIPPED | OUTPATIENT
Start: 2025-06-27

## 2025-06-27 RX ORDER — EZETIMIBE 10 MG/1
10 TABLET ORAL DAILY
Qty: 90 TABLET | Refills: 3 | Status: SHIPPED | OUTPATIENT
Start: 2025-06-27

## 2025-06-27 RX ORDER — LOSARTAN POTASSIUM 100 MG/1
100 TABLET ORAL DAILY
Qty: 90 TABLET | Refills: 3 | Status: SHIPPED | OUTPATIENT
Start: 2025-06-27

## 2025-07-26 LAB
ANION GAP SERPL CALCULATED.4IONS-SCNC: 12 MMOL/L (CALC) (ref 7–17)
BUN SERPL-MCNC: 26 MG/DL (ref 7–25)
BUN/CREAT SERPL: 18 (CALC) (ref 6–22)
CALCIUM SERPL-MCNC: 9.4 MG/DL (ref 8.6–10.3)
CHLORIDE SERPL-SCNC: 106 MMOL/L (ref 98–110)
CO2 SERPL-SCNC: 21 MMOL/L (ref 20–32)
CREAT SERPL-MCNC: 1.46 MG/DL (ref 0.7–1.28)
EGFRCR SERPLBLD CKD-EPI 2021: 50 ML/MIN/1.73M2
EST. AVERAGE GLUCOSE BLD GHB EST-MCNC: 200 MG/DL
EST. AVERAGE GLUCOSE BLD GHB EST-SCNC: 11.1 MMOL/L
GLUCOSE SERPL-MCNC: 284 MG/DL (ref 65–99)
HBA1C MFR BLD: 8.6 %
POTASSIUM SERPL-SCNC: 4.6 MMOL/L (ref 3.5–5.3)
SODIUM SERPL-SCNC: 139 MMOL/L (ref 135–146)

## 2025-08-23 ENCOUNTER — RESULTS FOLLOW-UP (OUTPATIENT)
Dept: PRIMARY CARE | Facility: CLINIC | Age: 75
End: 2025-08-23
Payer: MEDICARE

## 2025-08-23 DIAGNOSIS — E11.22 TYPE 2 DIABETES MELLITUS WITH STAGE 3B CHRONIC KIDNEY DISEASE, WITHOUT LONG-TERM CURRENT USE OF INSULIN (MULTI): Primary | ICD-10-CM

## 2025-08-23 DIAGNOSIS — N18.32 TYPE 2 DIABETES MELLITUS WITH STAGE 3B CHRONIC KIDNEY DISEASE, WITHOUT LONG-TERM CURRENT USE OF INSULIN (MULTI): Primary | ICD-10-CM

## 2025-09-09 ENCOUNTER — APPOINTMENT (OUTPATIENT)
Dept: PHARMACY | Facility: HOSPITAL | Age: 75
End: 2025-09-09
Payer: MEDICARE

## 2025-09-19 ENCOUNTER — APPOINTMENT (OUTPATIENT)
Dept: OPHTHALMOLOGY | Facility: CLINIC | Age: 75
End: 2025-09-19
Payer: MEDICARE